# Patient Record
Sex: MALE | Race: BLACK OR AFRICAN AMERICAN | NOT HISPANIC OR LATINO | Employment: UNEMPLOYED | URBAN - METROPOLITAN AREA
[De-identification: names, ages, dates, MRNs, and addresses within clinical notes are randomized per-mention and may not be internally consistent; named-entity substitution may affect disease eponyms.]

---

## 2017-07-07 ENCOUNTER — HISTORICAL (OUTPATIENT)
Dept: LAB | Facility: HOSPITAL | Age: 57
End: 2017-07-07

## 2017-07-07 LAB
ALBUMIN SERPL-MCNC: 3.8 GM/DL (ref 3.4–5)
ALBUMIN/GLOB SERPL: 1.2 RATIO (ref 1.1–2)
ALP SERPL-CCNC: 78 UNIT/L (ref 46–116)
ALT SERPL-CCNC: 46 UNIT/L (ref 12–78)
APPEARANCE, UA: CLEAR
AST SERPL-CCNC: 30 UNIT/L (ref 15–37)
BACTERIA #/AREA URNS AUTO: NORMAL /HPF
BILIRUB SERPL-MCNC: 0.4 MG/DL (ref 0.2–1)
BILIRUB UR QL STRIP: NEGATIVE
BILIRUBIN DIRECT+TOT PNL SERPL-MCNC: 0.11 MG/DL (ref 0–0.2)
BILIRUBIN DIRECT+TOT PNL SERPL-MCNC: 0.29 MG/DL (ref 0–0.8)
BUN SERPL-MCNC: 12 MG/DL (ref 7–18)
CALCIUM SERPL-MCNC: 9 MG/DL (ref 8.5–10.1)
CHLORIDE SERPL-SCNC: 105 MMOL/L (ref 98–107)
CHOLEST SERPL-MCNC: 151 MG/DL (ref 0–200)
CHOLEST/HDLC SERPL: 3 {RATIO} (ref 0–5)
CO2 SERPL-SCNC: 27.4 MMOL/L (ref 21–32)
COLOR UR: YELLOW
CREAT SERPL-MCNC: 1.2 MG/DL (ref 0.6–1.3)
ERYTHROCYTE [DISTWIDTH] IN BLOOD BY AUTOMATED COUNT: 13.9 % (ref 11.5–17)
GLOBULIN SER-MCNC: 3.1 GM/DL (ref 2.4–3.5)
GLUCOSE (UA): NEGATIVE
GLUCOSE SERPL-MCNC: 114 MG/DL (ref 74–106)
HCT VFR BLD AUTO: 41.2 % (ref 42–52)
HDLC SERPL-MCNC: 50 MG/DL (ref 40–60)
HGB BLD-MCNC: 13.4 GM/DL (ref 14–18)
HGB UR QL STRIP: NEGATIVE
KETONES UR QL STRIP: NEGATIVE
LDLC SERPL CALC-MCNC: 81 MG/DL (ref 0–129)
LEUKOCYTE ESTERASE UR QL STRIP: NEGATIVE
MCH RBC QN AUTO: 30 PG (ref 27–31)
MCHC RBC AUTO-ENTMCNC: 32.4 GM/DL (ref 33–36)
MCV RBC AUTO: 92.5 FL (ref 80–94)
NITRITE UR QL STRIP.AUTO: NEGATIVE
PH UR STRIP: 5.5 [PH] (ref 5–9)
PLATELET # BLD AUTO: 220 X10(3)/MCL (ref 130–400)
PMV BLD AUTO: 8.6 FL (ref 7.4–10.4)
POTASSIUM SERPL-SCNC: 3.7 MMOL/L (ref 3.5–5.1)
PROT SERPL-MCNC: 6.9 GM/DL (ref 6.4–8.2)
PROT UR QL STRIP: NEGATIVE
RBC # BLD AUTO: 4.46 X10(6)/MCL (ref 4.7–6.1)
RBC #/AREA URNS HPF: NORMAL /[HPF]
SODIUM SERPL-SCNC: 143 MMOL/L (ref 136–145)
SP GR UR STRIP: >=1.03 (ref 1–1.03)
SQUAMOUS EPITHELIAL, UA: NORMAL
TRIGL SERPL-MCNC: 99 MG/DL
TSH SERPL-ACNC: 1.29 MIU/ML (ref 0.36–3.74)
UROBILINOGEN UR STRIP-ACNC: 0.2
VLDLC SERPL CALC-MCNC: 20 MG/DL
WBC # SPEC AUTO: 7.8 X10(3)/MCL (ref 4.5–11.5)
WBC #/AREA URNS AUTO: NORMAL /HPF

## 2017-10-20 ENCOUNTER — HISTORICAL (OUTPATIENT)
Dept: LAB | Facility: HOSPITAL | Age: 57
End: 2017-10-20

## 2017-10-20 LAB
ABS NEUT (OLG): 4.85 X10(3)/MCL (ref 2.1–9.2)
ALBUMIN SERPL-MCNC: 3.5 GM/DL (ref 3.4–5)
ALBUMIN/GLOB SERPL: 1 RATIO (ref 1.1–2)
ALP SERPL-CCNC: 76 UNIT/L (ref 50–136)
ALT SERPL-CCNC: 39 UNIT/L (ref 12–78)
AST SERPL-CCNC: 25 UNIT/L (ref 15–37)
BASOPHILS # BLD AUTO: 0 X10(3)/MCL (ref 0–0.2)
BASOPHILS NFR BLD AUTO: 1 %
BILIRUB SERPL-MCNC: 0.4 MG/DL (ref 0.2–1)
BILIRUBIN DIRECT+TOT PNL SERPL-MCNC: 0.1 MG/DL (ref 0–0.5)
BILIRUBIN DIRECT+TOT PNL SERPL-MCNC: 0.3 MG/DL (ref 0–0.8)
BUN SERPL-MCNC: 10 MG/DL (ref 7–18)
CALCIUM SERPL-MCNC: 8.9 MG/DL (ref 8.5–10.1)
CHLORIDE SERPL-SCNC: 105 MMOL/L (ref 98–107)
CO2 SERPL-SCNC: 29 MMOL/L (ref 21–32)
CREAT SERPL-MCNC: 1.66 MG/DL (ref 0.7–1.3)
EOSINOPHIL # BLD AUTO: 0.3 X10(3)/MCL (ref 0–0.9)
EOSINOPHIL NFR BLD AUTO: 4 %
ERYTHROCYTE [DISTWIDTH] IN BLOOD BY AUTOMATED COUNT: 13.7 % (ref 11.5–17)
GLOBULIN SER-MCNC: 3.4 GM/DL (ref 2.4–3.5)
GLUCOSE SERPL-MCNC: 104 MG/DL (ref 74–106)
HCT VFR BLD AUTO: 43.4 % (ref 42–52)
HGB BLD-MCNC: 14.7 GM/DL (ref 14–18)
LYMPHOCYTES # BLD AUTO: 2 X10(3)/MCL (ref 0.6–4.6)
LYMPHOCYTES NFR BLD AUTO: 25 %
MCH RBC QN AUTO: 30.8 PG (ref 27–31)
MCHC RBC AUTO-ENTMCNC: 33.9 GM/DL (ref 33–36)
MCV RBC AUTO: 91 FL (ref 80–94)
MONOCYTES # BLD AUTO: 0.6 X10(3)/MCL (ref 0.1–1.3)
MONOCYTES NFR BLD AUTO: 8 %
NEUTROPHILS # BLD AUTO: 4.85 X10(3)/MCL (ref 1.4–7.9)
NEUTROPHILS NFR BLD AUTO: 62 %
PLATELET # BLD AUTO: 205 X10(3)/MCL (ref 130–400)
PMV BLD AUTO: 10 FL (ref 9.4–12.4)
POTASSIUM SERPL-SCNC: 3.6 MMOL/L (ref 3.5–5.1)
PROT SERPL-MCNC: 6.9 GM/DL (ref 6.4–8.2)
RBC # BLD AUTO: 4.77 X10(6)/MCL (ref 4.7–6.1)
SODIUM SERPL-SCNC: 142 MMOL/L (ref 136–145)
WBC # SPEC AUTO: 7.8 X10(3)/MCL (ref 4.5–11.5)

## 2017-10-23 ENCOUNTER — HISTORICAL (OUTPATIENT)
Dept: ADMINISTRATIVE | Facility: HOSPITAL | Age: 57
End: 2017-10-23

## 2017-12-06 ENCOUNTER — HISTORICAL (OUTPATIENT)
Dept: LAB | Facility: HOSPITAL | Age: 57
End: 2017-12-06

## 2017-12-06 LAB — PSA SERPL-MCNC: 1.33 NG/ML (ref 0–4)

## 2018-12-03 ENCOUNTER — HISTORICAL (OUTPATIENT)
Dept: LAB | Facility: HOSPITAL | Age: 58
End: 2018-12-03

## 2018-12-03 LAB
ALBUMIN SERPL-MCNC: 3.7 GM/DL (ref 3.4–5)
ALBUMIN/GLOB SERPL: 1.1 RATIO (ref 1.1–2)
ALP SERPL-CCNC: 72 UNIT/L (ref 46–116)
ALT SERPL-CCNC: 40 UNIT/L (ref 12–78)
APPEARANCE, UA: CLEAR
AST SERPL-CCNC: 26 UNIT/L (ref 15–37)
BACTERIA #/AREA URNS AUTO: NORMAL /HPF
BILIRUB SERPL-MCNC: 0.4 MG/DL (ref 0.2–1)
BILIRUB UR QL STRIP: NEGATIVE
BILIRUBIN DIRECT+TOT PNL SERPL-MCNC: 0.13 MG/DL (ref 0–0.2)
BILIRUBIN DIRECT+TOT PNL SERPL-MCNC: 0.27 MG/DL (ref 0–0.8)
BUN SERPL-MCNC: 9.8 MG/DL (ref 7–18)
CALCIUM SERPL-MCNC: 9.5 MG/DL (ref 8.5–10.1)
CHLORIDE SERPL-SCNC: 102 MMOL/L (ref 98–107)
CHOLEST SERPL-MCNC: 157 MG/DL (ref 0–200)
CHOLEST/HDLC SERPL: 3.4 {RATIO} (ref 0–5)
CO2 SERPL-SCNC: 34.4 MMOL/L (ref 21–32)
COLOR UR: YELLOW
CREAT SERPL-MCNC: 1.22 MG/DL (ref 0.6–1.3)
ERYTHROCYTE [DISTWIDTH] IN BLOOD BY AUTOMATED COUNT: 12.9 % (ref 11.5–17)
GLOBULIN SER-MCNC: 3.5 GM/DL (ref 2.4–3.5)
GLUCOSE (UA): NEGATIVE
GLUCOSE SERPL-MCNC: 89 MG/DL (ref 74–106)
HCT VFR BLD AUTO: 42.6 % (ref 42–52)
HDLC SERPL-MCNC: 46 MG/DL (ref 40–60)
HGB BLD-MCNC: 14.1 GM/DL (ref 14–18)
HGB UR QL STRIP: NEGATIVE
KETONES UR QL STRIP: NEGATIVE
LDLC SERPL CALC-MCNC: 94 MG/DL (ref 0–129)
LEUKOCYTE ESTERASE UR QL STRIP: NEGATIVE
MCH RBC QN AUTO: 30.3 PG (ref 27–31)
MCHC RBC AUTO-ENTMCNC: 33.1 GM/DL (ref 33–36)
MCV RBC AUTO: 91.6 FL (ref 80–94)
NITRITE UR QL STRIP.AUTO: NEGATIVE
PH UR STRIP: 7.5 [PH] (ref 5–9)
PLATELET # BLD AUTO: 222 X10(3)/MCL (ref 130–400)
PMV BLD AUTO: 9.6 FL (ref 9.4–12.4)
POTASSIUM SERPL-SCNC: 4.6 MMOL/L (ref 3.5–5.1)
PROT SERPL-MCNC: 7.2 GM/DL (ref 6.4–8.2)
PROT UR QL STRIP: NEGATIVE
PSA SERPL-MCNC: 1.75 NG/ML (ref 0–4)
RBC # BLD AUTO: 4.65 X10(6)/MCL (ref 4.7–6.1)
RBC #/AREA URNS HPF: NORMAL /HPF
SODIUM SERPL-SCNC: 141 MMOL/L (ref 136–145)
SP GR UR STRIP: 1.01 (ref 1–1.03)
SQUAMOUS EPITHELIAL, UA: NORMAL
TRIGL SERPL-MCNC: 87 MG/DL
TSH SERPL-ACNC: 2.29 MIU/ML (ref 0.36–3.74)
UROBILINOGEN UR STRIP-ACNC: 0.2
VLDLC SERPL CALC-MCNC: 17 MG/DL
WBC # SPEC AUTO: 5.4 X10(3)/MCL (ref 4.5–11.5)
WBC #/AREA URNS AUTO: NORMAL /[HPF]

## 2019-12-09 ENCOUNTER — HISTORICAL (OUTPATIENT)
Dept: LAB | Facility: HOSPITAL | Age: 59
End: 2019-12-09

## 2019-12-09 LAB
ALBUMIN SERPL-MCNC: 3.4 GM/DL (ref 3.4–5)
ALBUMIN/GLOB SERPL: 1 RATIO (ref 1.1–2)
ALP SERPL-CCNC: 72 UNIT/L (ref 46–116)
ALT SERPL-CCNC: 49 UNIT/L (ref 12–78)
APPEARANCE, UA: CLEAR
AST SERPL-CCNC: 39 UNIT/L (ref 15–37)
BACTERIA SPEC CULT: ABNORMAL
BILIRUB SERPL-MCNC: 0.5 MG/DL (ref 0.2–1)
BILIRUB UR QL STRIP: NEGATIVE
BILIRUBIN DIRECT+TOT PNL SERPL-MCNC: 0.18 MG/DL (ref 0–0.2)
BILIRUBIN DIRECT+TOT PNL SERPL-MCNC: 0.32 MG/DL (ref 0–0.8)
BUN SERPL-MCNC: 13.1 MG/DL (ref 7–18)
CALCIUM SERPL-MCNC: 8.8 MG/DL (ref 8.5–10.1)
CHLORIDE SERPL-SCNC: 101 MMOL/L (ref 98–107)
CHOLEST SERPL-MCNC: 145 MG/DL (ref 0–200)
CHOLEST/HDLC SERPL: 3.8 {RATIO} (ref 0–5)
CO2 SERPL-SCNC: 29.9 MMOL/L (ref 21–32)
COLOR UR: YELLOW
CREAT SERPL-MCNC: 1.39 MG/DL (ref 0.6–1.3)
ERYTHROCYTE [DISTWIDTH] IN BLOOD BY AUTOMATED COUNT: 13 % (ref 11.5–17)
GLOBULIN SER-MCNC: 3.3 GM/DL (ref 2.4–3.5)
GLUCOSE (UA): NEGATIVE
GLUCOSE SERPL-MCNC: 70 MG/DL (ref 74–106)
HCT VFR BLD AUTO: 41.2 % (ref 42–52)
HDLC SERPL-MCNC: 38 MG/DL (ref 40–60)
HGB BLD-MCNC: 13.3 GM/DL (ref 14–18)
HGB UR QL STRIP: NEGATIVE
KETONES UR QL STRIP: NEGATIVE
LDLC SERPL CALC-MCNC: 90 MG/DL (ref 0–129)
LEUKOCYTE ESTERASE UR QL STRIP: NEGATIVE
MCH RBC QN AUTO: 30.1 PG (ref 27–31)
MCHC RBC AUTO-ENTMCNC: 32.3 GM/DL (ref 33–36)
MCV RBC AUTO: 93.2 FL (ref 80–94)
NITRITE UR QL STRIP: NEGATIVE
PH UR STRIP: 6 [PH] (ref 5–9)
PLATELET # BLD AUTO: 224 X10(3)/MCL (ref 130–400)
PMV BLD AUTO: 10 FL (ref 9.4–12.4)
POTASSIUM SERPL-SCNC: 3.6 MMOL/L (ref 3.5–5.1)
PROT SERPL-MCNC: 6.7 GM/DL (ref 6.4–8.2)
PROT UR QL STRIP: NEGATIVE
PSA SERPL-MCNC: 1.39 NG/ML (ref 0–4)
RBC # BLD AUTO: 4.42 X10(6)/MCL (ref 4.7–6.1)
RBC #/AREA URNS HPF: ABNORMAL /HPF
SODIUM SERPL-SCNC: 141 MMOL/L (ref 136–145)
SP GR UR STRIP: 1.01 (ref 1–1.03)
SQUAMOUS EPITHELIAL, UA: ABNORMAL
TRIGL SERPL-MCNC: 83 MG/DL
TSH SERPL-ACNC: 2.25 MIU/ML (ref 0.36–3.74)
UROBILINOGEN UR STRIP-ACNC: 0.2
VLDLC SERPL CALC-MCNC: 17 MG/DL
WBC # SPEC AUTO: 4.5 X10(3)/MCL (ref 4.5–11.5)
WBC #/AREA URNS HPF: ABNORMAL /HPF

## 2020-10-27 ENCOUNTER — HISTORICAL (OUTPATIENT)
Dept: LAB | Facility: HOSPITAL | Age: 60
End: 2020-10-27

## 2020-10-27 LAB
ALBUMIN SERPL-MCNC: 4.05 GM/DL (ref 3.4–5)
ALBUMIN/GLOB SERPL: 0.9 RATIO (ref 1.1–2)
ALP SERPL-CCNC: 82 UNIT/L (ref 46–116)
ALT SERPL-CCNC: 49 UNIT/L (ref 12–78)
AST SERPL-CCNC: 37 UNIT/L (ref 15–37)
BILIRUB SERPL-MCNC: 0.7 MG/DL (ref 0.2–1)
BILIRUBIN DIRECT+TOT PNL SERPL-MCNC: 0.18 MG/DL (ref 0–0.2)
BILIRUBIN DIRECT+TOT PNL SERPL-MCNC: 0.52 MG/DL (ref 0–0.8)
BUN SERPL-MCNC: 11.8 MG/DL (ref 7–18)
CALCIUM SERPL-MCNC: 9.8 MG/DL (ref 8.5–10.1)
CHLORIDE SERPL-SCNC: 102 MMOL/L (ref 98–107)
CO2 SERPL-SCNC: 31.2 MMOL/L (ref 21–32)
CREAT SERPL-MCNC: 1.21 MG/DL (ref 0.6–1.3)
GLOBULIN SER-MCNC: 4.45 GM/DL (ref 2.4–3.5)
GLUCOSE SERPL-MCNC: 85 MG/DL (ref 74–106)
POTASSIUM SERPL-SCNC: 3.6 MMOL/L (ref 3.5–5.1)
PROT SERPL-MCNC: 8.5 GM/DL (ref 6.4–8.2)
SODIUM SERPL-SCNC: 140 MMOL/L (ref 136–145)

## 2020-12-21 ENCOUNTER — HISTORICAL (OUTPATIENT)
Dept: LAB | Facility: HOSPITAL | Age: 60
End: 2020-12-21

## 2020-12-21 LAB
HEMOCCULT SP1 STL QL: NEGATIVE
HEMOCCULT SP2 STL QL: NEGATIVE

## 2021-01-21 ENCOUNTER — HISTORICAL (OUTPATIENT)
Dept: LAB | Facility: HOSPITAL | Age: 61
End: 2021-01-21

## 2021-01-21 LAB
ALBUMIN SERPL-MCNC: 4.1 GM/DL (ref 3.4–4.8)
ALBUMIN/GLOB SERPL: 1.3 RATIO (ref 1.1–2)
ALP SERPL-CCNC: 75 UNIT/L (ref 40–150)
ALT SERPL-CCNC: 35 UNIT/L (ref 0–55)
APPEARANCE, UA: CLEAR
AST SERPL-CCNC: 31 UNIT/L (ref 5–34)
BACTERIA SPEC CULT: NORMAL
BILIRUB SERPL-MCNC: 0.6 MG/DL
BILIRUB UR QL STRIP: NEGATIVE
BILIRUBIN DIRECT+TOT PNL SERPL-MCNC: 0.2 MG/DL (ref 0–0.5)
BILIRUBIN DIRECT+TOT PNL SERPL-MCNC: 0.4 MG/DL (ref 0–0.8)
BUN SERPL-MCNC: 7.5 MG/DL (ref 8.4–25.7)
CALCIUM SERPL-MCNC: 9.3 MG/DL (ref 8.8–10)
CHLORIDE SERPL-SCNC: 101 MMOL/L (ref 98–107)
CHOLEST SERPL-MCNC: 146 MG/DL
CHOLEST/HDLC SERPL: 3 {RATIO} (ref 0–5)
CO2 SERPL-SCNC: 29 MMOL/L (ref 23–31)
COLOR UR: YELLOW
CREAT SERPL-MCNC: 1.17 MG/DL (ref 0.73–1.18)
ERYTHROCYTE [DISTWIDTH] IN BLOOD BY AUTOMATED COUNT: 13.2 % (ref 11.5–17)
GLOBULIN SER-MCNC: 3.2 GM/DL (ref 2.4–3.5)
GLUCOSE (UA): NEGATIVE
GLUCOSE SERPL-MCNC: 85 MG/DL (ref 82–115)
HCT VFR BLD AUTO: 43.5 % (ref 42–52)
HDLC SERPL-MCNC: 52 MG/DL (ref 35–60)
HGB BLD-MCNC: 14.1 GM/DL (ref 14–18)
HGB UR QL STRIP: NEGATIVE
KETONES UR QL STRIP: NEGATIVE
LDLC SERPL CALC-MCNC: 80 MG/DL (ref 50–140)
LEUKOCYTE ESTERASE UR QL STRIP: NEGATIVE
MCH RBC QN AUTO: 31.1 PG (ref 27–31)
MCHC RBC AUTO-ENTMCNC: 32.4 GM/DL (ref 33–36)
MCV RBC AUTO: 96 FL (ref 80–94)
NITRITE UR QL STRIP: NEGATIVE
PH UR STRIP: 8.5 [PH] (ref 5–9)
PLATELET # BLD AUTO: 247 X10(3)/MCL (ref 130–400)
PMV BLD AUTO: 9.7 FL (ref 9.4–12.4)
POTASSIUM SERPL-SCNC: 3.4 MMOL/L (ref 3.5–5.1)
PROT SERPL-MCNC: 7.3 GM/DL (ref 5.8–7.6)
PROT UR QL STRIP: NEGATIVE
PSA SERPL-MCNC: 3.05 NG/ML
RBC # BLD AUTO: 4.53 X10(6)/MCL (ref 4.7–6.1)
RBC #/AREA URNS HPF: NORMAL /[HPF]
SODIUM SERPL-SCNC: 138 MMOL/L (ref 136–145)
SP GR UR STRIP: 1.02 (ref 1–1.03)
SQUAMOUS EPITHELIAL, UA: NORMAL
TRIGL SERPL-MCNC: 69 MG/DL (ref 34–140)
TSH SERPL-ACNC: 2.59 UIU/ML (ref 0.35–4.94)
UROBILINOGEN UR STRIP-ACNC: 0.2
VLDLC SERPL CALC-MCNC: 14 MG/DL
WBC # SPEC AUTO: 6.4 X10(3)/MCL (ref 4.5–11.5)
WBC #/AREA URNS HPF: NORMAL /[HPF]

## 2021-09-08 ENCOUNTER — HISTORICAL (OUTPATIENT)
Dept: RADIOLOGY | Facility: HOSPITAL | Age: 61
End: 2021-09-08

## 2022-04-21 ENCOUNTER — HISTORICAL (OUTPATIENT)
Dept: LAB | Facility: HOSPITAL | Age: 62
End: 2022-04-21

## 2022-04-21 ENCOUNTER — HISTORICAL (OUTPATIENT)
Dept: ADMINISTRATIVE | Facility: HOSPITAL | Age: 62
End: 2022-04-21

## 2022-04-21 LAB
ALBUMIN SERPL-MCNC: 3.6 G/DL (ref 3.4–4.8)
ALBUMIN/GLOB SERPL: 1.1 {RATIO} (ref 1.1–2)
ALP SERPL-CCNC: 79 U/L (ref 40–150)
ALT SERPL-CCNC: 42 U/L (ref 0–55)
APPEARANCE, UA: NORMAL
AST SERPL-CCNC: 34 U/L (ref 5–34)
BACTERIA SPEC CULT: NORMAL
BILIRUB SERPL-MCNC: 0.5 MG/DL
BILIRUB UR QL STRIP: NEGATIVE
BILIRUBIN DIRECT+TOT PNL SERPL-MCNC: 0.2 (ref 0–0.8)
BILIRUBIN DIRECT+TOT PNL SERPL-MCNC: 0.3 (ref 0–0.5)
BUN SERPL-MCNC: 8.4 MG/DL (ref 8.4–25.7)
CALCIUM SERPL-MCNC: 9.1 MG/DL (ref 8.7–10.5)
CHLORIDE SERPL-SCNC: 106 MMOL/L (ref 98–107)
CHOLEST SERPL-MCNC: 142 MG/DL
CHOLEST/HDLC SERPL: 3 {RATIO} (ref 0–5)
CO2 SERPL-SCNC: 29 MMOL/L (ref 23–31)
COLOR UR: YELLOW
CREAT SERPL-MCNC: 0.96 MG/DL (ref 0.73–1.18)
ERYTHROCYTE [DISTWIDTH] IN BLOOD BY AUTOMATED COUNT: 13.5 % (ref 11.5–17)
GLOBULIN SER-MCNC: 3.4 G/DL (ref 2.4–3.5)
GLUCOSE (UA): NEGATIVE
GLUCOSE SERPL-MCNC: 72 MG/DL (ref 82–115)
HBV SURFACE AG SERPL QL IA: 0.22
HBV SURFACE AG SERPL QL IA: NONREACTIVE
HCT VFR BLD AUTO: 44.7 % (ref 42–52)
HCV AB SERPL QL IA: 16.42
HCV AB SERPL QL IA: REACTIVE
HDLC SERPL-MCNC: 47 MG/DL (ref 35–60)
HEMOLYSIS INTERF INDEX SERPL-ACNC: 4
HGB BLD-MCNC: 13.8 G/DL (ref 14–18)
HGB UR QL STRIP: NEGATIVE
ICTERIC INTERF INDEX SERPL-ACNC: 0
KETONES UR QL STRIP: NEGATIVE
LDLC SERPL CALC-MCNC: 82 MG/DL (ref 50–140)
LEUKOCYTE ESTERASE UR QL STRIP: NORMAL
LIPEMIC INTERF INDEX SERPL-ACNC: 2
MCH RBC QN AUTO: 29.6 PG (ref 27–31)
MCHC RBC AUTO-ENTMCNC: 30.9 G/DL (ref 33–36)
MCV RBC AUTO: 95.9 FL (ref 80–94)
MUCOUS THREADS URNS QL MICRO: NORMAL
NITRITE UR QL STRIP: NEGATIVE
PH UR STRIP: 6.5 [PH] (ref 5–9)
PLATELET # BLD AUTO: 204 10*3/UL (ref 130–400)
PMV BLD AUTO: 9.7 FL (ref 9.4–12.4)
POTASSIUM SERPL-SCNC: 4 MMOL/L (ref 3.5–5.1)
PROT SERPL-MCNC: 7 G/DL (ref 5.8–7.6)
PROT UR QL STRIP: NEGATIVE
PSA SERPL-MCNC: 1.63 NG/ML
RBC # BLD AUTO: 4.66 10*6/UL (ref 4.7–6.1)
RBC #/AREA URNS HPF: NORMAL /[HPF] (ref 0–2)
SODIUM SERPL-SCNC: 144 MMOL/L (ref 136–145)
SP GR UR STRIP: 1.01 (ref 1–1.03)
SQUAMOUS EPITHELIAL, UA: NORMAL
TRIGL SERPL-MCNC: 66 MG/DL (ref 34–140)
TSH SERPL-ACNC: 1.32 M[IU]/L (ref 0.35–4.94)
UROBILINOGEN UR STRIP-ACNC: 0.2
VLDLC SERPL CALC-MCNC: 13 MG/DL
WBC # SPEC AUTO: 5.9 10*3/UL (ref 4.5–11.5)
WBC #/AREA URNS HPF: NORMAL /[HPF] (ref 0–2)

## 2022-07-12 ENCOUNTER — LAB VISIT (OUTPATIENT)
Dept: LAB | Facility: HOSPITAL | Age: 62
End: 2022-07-12
Attending: FAMILY MEDICINE
Payer: MEDICAID

## 2022-07-12 DIAGNOSIS — B19.20: Primary | ICD-10-CM

## 2022-07-12 PROCEDURE — 87522 HEPATITIS C REVRS TRNSCRPJ: CPT

## 2022-07-12 PROCEDURE — 36415 COLL VENOUS BLD VENIPUNCTURE: CPT

## 2022-07-14 LAB — HCV RNA SERPL NAA+PROBE-ACNC: ABNORMAL IU/ML

## 2022-09-07 DIAGNOSIS — B19.20 HEPATITIS C VIRUS INFECTION WITHOUT HEPATIC COMA, UNSPECIFIED CHRONICITY: Primary | ICD-10-CM

## 2022-09-12 DIAGNOSIS — B19.20 HEPATITIS C VIRUS INFECTION WITHOUT HEPATIC COMA, UNSPECIFIED CHRONICITY: Primary | ICD-10-CM

## 2022-09-26 ENCOUNTER — HOSPITAL ENCOUNTER (OUTPATIENT)
Dept: RADIOLOGY | Facility: HOSPITAL | Age: 62
Discharge: HOME | End: 2022-09-26
Attending: NURSE PRACTITIONER
Payer: MEDICAID

## 2022-09-26 DIAGNOSIS — B19.20 HEPATITIS C VIRUS INFECTION WITHOUT HEPATIC COMA, UNSPECIFIED CHRONICITY: ICD-10-CM

## 2022-09-26 PROCEDURE — 76705 ECHO EXAM OF ABDOMEN: CPT | Mod: TC

## 2022-11-17 ENCOUNTER — TELEPHONE (OUTPATIENT)
Dept: INFECTIOUS DISEASES | Facility: CLINIC | Age: 62
End: 2022-11-17
Payer: MEDICAID

## 2022-11-18 ENCOUNTER — LAB VISIT (OUTPATIENT)
Dept: LAB | Facility: HOSPITAL | Age: 62
End: 2022-11-18
Attending: NURSE PRACTITIONER
Payer: MEDICAID

## 2022-11-18 DIAGNOSIS — B19.20 HEPATITIS C VIRUS INFECTION WITHOUT HEPATIC COMA, UNSPECIFIED CHRONICITY: ICD-10-CM

## 2022-11-18 LAB
ALBUMIN SERPL-MCNC: 3.9 GM/DL (ref 3.4–4.8)
ALBUMIN/GLOB SERPL: 1.2 RATIO (ref 1.1–2)
ALP SERPL-CCNC: 81 UNIT/L (ref 40–150)
ALT SERPL-CCNC: 25 UNIT/L (ref 0–55)
AST SERPL-CCNC: 29 UNIT/L (ref 5–34)
BASOPHILS # BLD AUTO: 0.06 X10(3)/MCL (ref 0–0.2)
BASOPHILS NFR BLD AUTO: 0.8 %
BILIRUBIN DIRECT+TOT PNL SERPL-MCNC: 0.5 MG/DL
BUN SERPL-MCNC: 8.4 MG/DL (ref 8.4–25.7)
CALCIUM SERPL-MCNC: 9.4 MG/DL (ref 8.8–10)
CHLORIDE SERPL-SCNC: 104 MMOL/L (ref 98–107)
CO2 SERPL-SCNC: 26 MMOL/L (ref 23–31)
CREAT SERPL-MCNC: 0.99 MG/DL (ref 0.73–1.18)
EOSINOPHIL # BLD AUTO: 0.33 X10(3)/MCL (ref 0–0.9)
EOSINOPHIL NFR BLD AUTO: 4.6 %
ERYTHROCYTE [DISTWIDTH] IN BLOOD BY AUTOMATED COUNT: 13.3 % (ref 11.5–17)
FERRITIN SERPL-MCNC: 211.63 NG/ML (ref 21.81–274.66)
GFR SERPLBLD CREATININE-BSD FMLA CKD-EPI: >60 MLS/MIN/1.73/M2
GLOBULIN SER-MCNC: 3.3 GM/DL (ref 2.4–3.5)
GLUCOSE SERPL-MCNC: 147 MG/DL (ref 82–115)
HAV AB SER QL IA: NONREACTIVE
HBV CORE AB SERPL QL IA: REACTIVE
HBV SURFACE AB SER-ACNC: 2.51 MIU/ML
HBV SURFACE AB SERPL IA-ACNC: NONREACTIVE M[IU]/ML
HBV SURFACE AG SERPL QL IA: NONREACTIVE
HCT VFR BLD AUTO: 45.3 % (ref 42–52)
HGB BLD-MCNC: 14.7 GM/DL (ref 14–18)
HIV 1+2 AB+HIV1 P24 AG SERPL QL IA: NONREACTIVE
IMM GRANULOCYTES # BLD AUTO: 0.02 X10(3)/MCL (ref 0–0.04)
IMM GRANULOCYTES NFR BLD AUTO: 0.3 %
LYMPHOCYTES # BLD AUTO: 1.66 X10(3)/MCL (ref 0.6–4.6)
LYMPHOCYTES NFR BLD AUTO: 22.9 %
MCH RBC QN AUTO: 30.3 PG (ref 27–31)
MCHC RBC AUTO-ENTMCNC: 32.5 MG/DL (ref 33–36)
MCV RBC AUTO: 93.4 FL (ref 80–94)
MONOCYTES # BLD AUTO: 0.57 X10(3)/MCL (ref 0.1–1.3)
MONOCYTES NFR BLD AUTO: 7.9 %
NEUTROPHILS # BLD AUTO: 4.6 X10(3)/MCL (ref 2.1–9.2)
NEUTROPHILS NFR BLD AUTO: 63.5 %
NRBC BLD AUTO-RTO: 0 %
PLATELET # BLD AUTO: 217 X10(3)/MCL (ref 130–400)
PMV BLD AUTO: 10.7 FL (ref 7.4–10.4)
POTASSIUM SERPL-SCNC: 3.1 MMOL/L (ref 3.5–5.1)
PROT SERPL-MCNC: 7.2 GM/DL (ref 5.8–7.6)
RBC # BLD AUTO: 4.85 X10(6)/MCL (ref 4.7–6.1)
SODIUM SERPL-SCNC: 141 MMOL/L (ref 136–145)
T PALLIDUM AB SER QL: NONREACTIVE
WBC # SPEC AUTO: 7.3 X10(3)/MCL (ref 4.5–11.5)

## 2022-11-18 PROCEDURE — 86780 TREPONEMA PALLIDUM: CPT

## 2022-11-18 PROCEDURE — 86706 HEP B SURFACE ANTIBODY: CPT

## 2022-11-18 PROCEDURE — 87522 HEPATITIS C REVRS TRNSCRPJ: CPT

## 2022-11-18 PROCEDURE — 85610 PROTHROMBIN TIME: CPT

## 2022-11-18 PROCEDURE — 87340 HEPATITIS B SURFACE AG IA: CPT

## 2022-11-18 PROCEDURE — 86704 HEP B CORE ANTIBODY TOTAL: CPT

## 2022-11-18 PROCEDURE — 80053 COMPREHEN METABOLIC PANEL: CPT

## 2022-11-18 PROCEDURE — 36415 COLL VENOUS BLD VENIPUNCTURE: CPT

## 2022-11-18 PROCEDURE — 86708 HEPATITIS A ANTIBODY: CPT

## 2022-11-18 PROCEDURE — 82728 ASSAY OF FERRITIN: CPT

## 2022-11-18 PROCEDURE — 85025 COMPLETE CBC W/AUTO DIFF WBC: CPT

## 2022-11-18 PROCEDURE — 87389 HIV-1 AG W/HIV-1&-2 AB AG IA: CPT

## 2022-11-18 PROCEDURE — 81596 NFCT DS CHRNC HCV 6 ASSAYS: CPT

## 2022-11-18 PROCEDURE — 86038 ANTINUCLEAR ANTIBODIES: CPT

## 2022-11-18 PROCEDURE — 86039 ANTINUCLEAR ANTIBODIES (ANA): CPT

## 2022-11-18 PROCEDURE — 87902 NFCT AGT GNTYP ALYS HEP C: CPT

## 2022-11-19 LAB — HCV RNA SERPL NAA+PROBE-ACNC: ABNORMAL IU/ML

## 2022-11-20 LAB
AR ANA INTERPRETIVE COMMENT: ABNORMAL
AR ANA PATTERN: ABNORMAL
AR ANA TITER: ABNORMAL
AR ANTINUCLEAR ANTIBODY (ANA), HEP-2, IGG: DETECTED

## 2022-11-21 LAB
A2 MACROGLOB SERPL-MCNC: 270 MG/DL (ref 100–280)
ALT SERPL W P-5'-P-CCNC: 35 U/L (ref 7–55)
ANNOTATION COMMENT IMP: NORMAL
APO A-I SERPL-MCNC: 148 MG/DL
BILIRUB SERPL-MCNC: 0.4 MG/DL
FIBROSIS STAGE SERPL QL: NORMAL
GGT SERPL-CCNC: 17 U/L (ref 8–61)
HAPTOGLOB SERPL NEPH-MCNC: 86 MG/DL (ref 30–200)
HCV GENTYP SERPL NAA+PROBE: ABNORMAL
LIVER FIBR SCORE SERPL CALC.FIBROSURE: 0.37
LIVER FIBROSIS INTERPRETATION SER-IMP: NORMAL
NECROINFLAMMATORY ACT GRADE SERPL QL: NORMAL
NECROINFLAMMATORY ACT SCORE SERPL: 0.2
NECROINFLAMMATORY ACTIV INTERP SER-IMP: NORMAL
SERIAL #: NORMAL

## 2022-11-29 ENCOUNTER — OFFICE VISIT (OUTPATIENT)
Dept: INFECTIOUS DISEASES | Facility: CLINIC | Age: 62
End: 2022-11-29
Payer: MEDICAID

## 2022-11-29 VITALS
HEART RATE: 73 BPM | DIASTOLIC BLOOD PRESSURE: 75 MMHG | RESPIRATION RATE: 20 BRPM | SYSTOLIC BLOOD PRESSURE: 121 MMHG | WEIGHT: 168 LBS | OXYGEN SATURATION: 98 % | BODY MASS INDEX: 22.26 KG/M2 | HEIGHT: 73 IN | TEMPERATURE: 99 F

## 2022-11-29 DIAGNOSIS — I49.9 IRREGULAR HEART RATE: ICD-10-CM

## 2022-11-29 DIAGNOSIS — B18.2 CHRONIC HEPATITIS C WITHOUT HEPATIC COMA: Primary | ICD-10-CM

## 2022-11-29 DIAGNOSIS — R76.8 HEPATITIS B CORE ANTIBODY POSITIVE: ICD-10-CM

## 2022-11-29 DIAGNOSIS — Z23 NEED FOR VACCINATION: ICD-10-CM

## 2022-11-29 PROBLEM — I10 HYPERTENSION: Status: ACTIVE | Noted: 2022-11-29

## 2022-11-29 PROBLEM — E78.00 HYPERCHOLESTEROLEMIA: Status: ACTIVE | Noted: 2022-11-29

## 2022-11-29 PROBLEM — F17.200 CURRENT SMOKER: Status: ACTIVE | Noted: 2022-11-29

## 2022-11-29 PROCEDURE — 1159F MED LIST DOCD IN RCRD: CPT | Mod: CPTII,,, | Performed by: NURSE PRACTITIONER

## 2022-11-29 PROCEDURE — 1160F PR REVIEW ALL MEDS BY PRESCRIBER/CLIN PHARMACIST DOCUMENTED: ICD-10-PCS | Mod: CPTII,,, | Performed by: NURSE PRACTITIONER

## 2022-11-29 PROCEDURE — 90632 HEPA VACCINE ADULT IM: CPT | Mod: PBBFAC

## 2022-11-29 PROCEDURE — 1160F RVW MEDS BY RX/DR IN RCRD: CPT | Mod: CPTII,,, | Performed by: NURSE PRACTITIONER

## 2022-11-29 PROCEDURE — 3078F PR MOST RECENT DIASTOLIC BLOOD PRESSURE < 80 MM HG: ICD-10-PCS | Mod: CPTII,,, | Performed by: NURSE PRACTITIONER

## 2022-11-29 PROCEDURE — 1159F PR MEDICATION LIST DOCUMENTED IN MEDICAL RECORD: ICD-10-PCS | Mod: CPTII,,, | Performed by: NURSE PRACTITIONER

## 2022-11-29 PROCEDURE — 3074F SYST BP LT 130 MM HG: CPT | Mod: CPTII,,, | Performed by: NURSE PRACTITIONER

## 2022-11-29 PROCEDURE — 3074F PR MOST RECENT SYSTOLIC BLOOD PRESSURE < 130 MM HG: ICD-10-PCS | Mod: CPTII,,, | Performed by: NURSE PRACTITIONER

## 2022-11-29 PROCEDURE — 3008F PR BODY MASS INDEX (BMI) DOCUMENTED: ICD-10-PCS | Mod: CPTII,,, | Performed by: NURSE PRACTITIONER

## 2022-11-29 PROCEDURE — 99204 PR OFFICE/OUTPT VISIT, NEW, LEVL IV, 45-59 MIN: ICD-10-PCS | Mod: S$PBB,,, | Performed by: NURSE PRACTITIONER

## 2022-11-29 PROCEDURE — 3078F DIAST BP <80 MM HG: CPT | Mod: CPTII,,, | Performed by: NURSE PRACTITIONER

## 2022-11-29 PROCEDURE — 99204 OFFICE O/P NEW MOD 45 MIN: CPT | Mod: S$PBB,,, | Performed by: NURSE PRACTITIONER

## 2022-11-29 PROCEDURE — 3008F BODY MASS INDEX DOCD: CPT | Mod: CPTII,,, | Performed by: NURSE PRACTITIONER

## 2022-11-29 PROCEDURE — 90471 IMMUNIZATION ADMIN: CPT | Mod: PBBFAC

## 2022-11-29 PROCEDURE — 99214 OFFICE O/P EST MOD 30 MIN: CPT | Mod: PBBFAC | Performed by: NURSE PRACTITIONER

## 2022-11-29 RX ORDER — ATORVASTATIN CALCIUM 20 MG/1
20 TABLET, FILM COATED ORAL NIGHTLY
COMMUNITY
Start: 2022-11-19 | End: 2023-07-11 | Stop reason: SDUPTHER

## 2022-11-29 RX ORDER — AMLODIPINE BESYLATE 5 MG/1
5 TABLET ORAL DAILY
COMMUNITY
Start: 2022-10-31 | End: 2023-07-11 | Stop reason: SDUPTHER

## 2022-11-29 RX ORDER — GABAPENTIN 600 MG/1
600 TABLET ORAL 3 TIMES DAILY
COMMUNITY
Start: 2022-11-19 | End: 2023-02-02

## 2022-11-29 RX ORDER — ASPIRIN 81 MG/1
81 TABLET ORAL DAILY
COMMUNITY
End: 2023-07-11 | Stop reason: SDUPTHER

## 2022-11-29 RX ORDER — TIZANIDINE 4 MG/1
4 TABLET ORAL 2 TIMES DAILY
COMMUNITY
Start: 2022-11-19 | End: 2023-07-11 | Stop reason: SDUPTHER

## 2022-11-29 RX ORDER — CYPROHEPTADINE HYDROCHLORIDE 4 MG/1
4 TABLET ORAL NIGHTLY
COMMUNITY
Start: 2022-11-27 | End: 2023-07-11 | Stop reason: SDUPTHER

## 2022-11-29 RX ORDER — LAMIVUDINE 100 MG/1
100 TABLET, FILM COATED ORAL NIGHTLY
Qty: 84 TABLET | Refills: 0 | Status: SHIPPED | OUTPATIENT
Start: 2022-11-29 | End: 2022-12-29

## 2022-11-29 RX ORDER — VELPATASVIR AND SOFOSBUVIR 100; 400 MG/1; MG/1
1 TABLET, FILM COATED ORAL NIGHTLY
Qty: 84 TABLET | Refills: 0 | Status: SHIPPED | OUTPATIENT
Start: 2022-11-29 | End: 2023-03-09

## 2022-11-29 RX ORDER — ALBUTEROL SULFATE 90 UG/1
AEROSOL, METERED RESPIRATORY (INHALATION)
COMMUNITY
Start: 2022-11-28 | End: 2023-07-11 | Stop reason: SDUPTHER

## 2022-11-29 RX ORDER — ROPINIROLE 1 MG/1
1 TABLET, FILM COATED ORAL NIGHTLY
COMMUNITY
Start: 2022-10-05 | End: 2023-07-11 | Stop reason: SDUPTHER

## 2022-11-29 NOTE — NURSING
VIS printed and given to patient. Hep A and Influenza Vaccines administered using aseptic technique. Patient observed for 15 minutes for reactions, none noted. Patient tolerated well.

## 2022-12-01 ENCOUNTER — PROCEDURE VISIT (OUTPATIENT)
Dept: INFECTIOUS DISEASES | Facility: CLINIC | Age: 62
End: 2022-12-01
Payer: MEDICAID

## 2022-12-01 ENCOUNTER — CLINICAL SUPPORT (OUTPATIENT)
Dept: INFECTIOUS DISEASES | Facility: CLINIC | Age: 62
End: 2022-12-01
Payer: MEDICAID

## 2022-12-01 DIAGNOSIS — B18.2 CHRONIC HEPATITIS C WITHOUT HEPATIC COMA: Primary | ICD-10-CM

## 2022-12-01 PROCEDURE — 99211 OFF/OP EST MAY X REQ PHY/QHP: CPT | Mod: PBBFAC

## 2022-12-01 PROCEDURE — 91200 LIVER ELASTOGRAPHY: CPT | Mod: PBBFAC | Performed by: INTERNAL MEDICINE

## 2022-12-01 NOTE — PROGRESS NOTES
Pt came to clinic today for Epclusa teaching.  Pt was given Epclusa, by me.  Explained Hep  C summary form, Epclusa one tablet daily, make sure to have next month's Epclusa available before running out, increase fluids to help with possible side effects of headaches and fatique.  Okay to take ibuprofen for headaches, no alcohol intake, and before starting any new scripts, call our clinic or the pharmacy.  Notified that labs are due every 4 weeks while on treatment.  Hep C summary form, lab orders/dates given to patient.  Patient verbalized understanding of all the above information.

## 2022-12-28 ENCOUNTER — LAB VISIT (OUTPATIENT)
Dept: LAB | Facility: HOSPITAL | Age: 62
End: 2022-12-28
Attending: NURSE PRACTITIONER
Payer: MEDICAID

## 2022-12-28 DIAGNOSIS — B18.2 CHRONIC HEPATITIS C WITHOUT HEPATIC COMA: ICD-10-CM

## 2022-12-28 LAB
ALBUMIN SERPL-MCNC: 4 G/DL (ref 3.4–4.8)
ALBUMIN/GLOB SERPL: 1.1 RATIO (ref 1.1–2)
ALP SERPL-CCNC: 78 UNIT/L (ref 40–150)
ALT SERPL-CCNC: 16 UNIT/L (ref 0–55)
AST SERPL-CCNC: 23 UNIT/L (ref 5–34)
BASOPHILS # BLD AUTO: 0.06 X10(3)/MCL (ref 0–0.2)
BASOPHILS NFR BLD AUTO: 0.9 %
BILIRUBIN DIRECT+TOT PNL SERPL-MCNC: 0.6 MG/DL
BUN SERPL-MCNC: 8.7 MG/DL (ref 8.4–25.7)
CALCIUM SERPL-MCNC: 9.6 MG/DL (ref 8.8–10)
CHLORIDE SERPL-SCNC: 105 MMOL/L (ref 98–107)
CO2 SERPL-SCNC: 26 MMOL/L (ref 23–31)
CREAT SERPL-MCNC: 1.05 MG/DL (ref 0.73–1.18)
EOSINOPHIL # BLD AUTO: 0.27 X10(3)/MCL (ref 0–0.9)
EOSINOPHIL NFR BLD AUTO: 4 %
ERYTHROCYTE [DISTWIDTH] IN BLOOD BY AUTOMATED COUNT: 13.4 % (ref 11.6–14.4)
GFR SERPLBLD CREATININE-BSD FMLA CKD-EPI: >60 MLS/MIN/1.73/M2
GLOBULIN SER-MCNC: 3.7 GM/DL (ref 2.4–3.5)
GLUCOSE SERPL-MCNC: 90 MG/DL (ref 82–115)
HCT VFR BLD AUTO: 44.9 % (ref 42–52)
HGB BLD-MCNC: 15 GM/DL (ref 14–18)
IMM GRANULOCYTES # BLD AUTO: 0.01 X10(3)/MCL (ref 0–0.04)
IMM GRANULOCYTES NFR BLD AUTO: 0.1 %
LYMPHOCYTES # BLD AUTO: 2.05 X10(3)/MCL (ref 0.6–4.6)
LYMPHOCYTES NFR BLD AUTO: 30.6 %
MCH RBC QN AUTO: 30.9 PG
MCHC RBC AUTO-ENTMCNC: 33.4 MG/DL (ref 33–36)
MCV RBC AUTO: 92.4 FL (ref 80–94)
MONOCYTES # BLD AUTO: 0.58 X10(3)/MCL (ref 0.1–1.3)
MONOCYTES NFR BLD AUTO: 8.6 %
NEUTROPHILS # BLD AUTO: 3.74 X10(3)/MCL (ref 2.1–9.2)
NEUTROPHILS NFR BLD AUTO: 55.8 %
NRBC BLD AUTO-RTO: 0 % (ref 0–1)
PLATELET # BLD AUTO: 207 X10(3)/MCL (ref 140–371)
PMV BLD AUTO: 10.3 FL (ref 9.4–12.4)
POTASSIUM SERPL-SCNC: 3.8 MMOL/L (ref 3.5–5.1)
PROT SERPL-MCNC: 7.7 GM/DL (ref 5.8–7.6)
RBC # BLD AUTO: 4.86 X10(6)/MCL (ref 4.7–6.1)
SODIUM SERPL-SCNC: 140 MMOL/L (ref 136–145)
WBC # SPEC AUTO: 6.7 X10(3)/MCL (ref 4.5–11.5)

## 2022-12-28 PROCEDURE — 36415 COLL VENOUS BLD VENIPUNCTURE: CPT

## 2022-12-28 PROCEDURE — 80053 COMPREHEN METABOLIC PANEL: CPT

## 2022-12-28 PROCEDURE — 85025 COMPLETE CBC W/AUTO DIFF WBC: CPT

## 2022-12-28 PROCEDURE — 87522 HEPATITIS C REVRS TRNSCRPJ: CPT

## 2022-12-29 LAB — HCV RNA SERPL NAA+PROBE-ACNC: NORMAL IU/ML

## 2023-02-02 ENCOUNTER — OFFICE VISIT (OUTPATIENT)
Dept: INFECTIOUS DISEASES | Facility: CLINIC | Age: 63
End: 2023-02-02
Payer: MEDICAID

## 2023-02-02 VITALS
DIASTOLIC BLOOD PRESSURE: 91 MMHG | WEIGHT: 159.69 LBS | BODY MASS INDEX: 21.16 KG/M2 | TEMPERATURE: 98 F | HEART RATE: 70 BPM | HEIGHT: 73 IN | SYSTOLIC BLOOD PRESSURE: 131 MMHG

## 2023-02-02 DIAGNOSIS — B18.2 CHRONIC HEPATITIS C WITHOUT HEPATIC COMA: Primary | ICD-10-CM

## 2023-02-02 DIAGNOSIS — Z23 NEED FOR VACCINATION: ICD-10-CM

## 2023-02-02 DIAGNOSIS — E78.00 HYPERCHOLESTEROLEMIA: ICD-10-CM

## 2023-02-02 DIAGNOSIS — I10 PRIMARY HYPERTENSION: ICD-10-CM

## 2023-02-02 DIAGNOSIS — R76.8 HEPATITIS B CORE ANTIBODY POSITIVE: ICD-10-CM

## 2023-02-02 LAB
ALBUMIN SERPL-MCNC: 3.8 G/DL (ref 3.4–4.8)
ALBUMIN/GLOB SERPL: 1.4 RATIO (ref 1.1–2)
ALP SERPL-CCNC: 62 UNIT/L (ref 40–150)
ALT SERPL-CCNC: 15 UNIT/L (ref 0–55)
AST SERPL-CCNC: 20 UNIT/L (ref 5–34)
BASOPHILS # BLD AUTO: 0.04 X10(3)/MCL (ref 0–0.2)
BASOPHILS NFR BLD AUTO: 0.6 %
BILIRUBIN DIRECT+TOT PNL SERPL-MCNC: 0.5 MG/DL
BUN SERPL-MCNC: 8.3 MG/DL (ref 8.4–25.7)
CALCIUM SERPL-MCNC: 9.3 MG/DL (ref 8.8–10)
CHLORIDE SERPL-SCNC: 106 MMOL/L (ref 98–107)
CO2 SERPL-SCNC: 26 MMOL/L (ref 23–31)
CREAT SERPL-MCNC: 1.09 MG/DL (ref 0.73–1.18)
EOSINOPHIL # BLD AUTO: 0.19 X10(3)/MCL (ref 0–0.9)
EOSINOPHIL NFR BLD AUTO: 3 %
ERYTHROCYTE [DISTWIDTH] IN BLOOD BY AUTOMATED COUNT: 14.6 % (ref 11.5–17)
GFR SERPLBLD CREATININE-BSD FMLA CKD-EPI: >60 MLS/MIN/1.73/M2
GLOBULIN SER-MCNC: 2.7 GM/DL (ref 2.4–3.5)
GLUCOSE SERPL-MCNC: 82 MG/DL (ref 82–115)
HCT VFR BLD AUTO: 41.1 % (ref 42–52)
HGB BLD-MCNC: 13.5 GM/DL (ref 14–18)
IMM GRANULOCYTES # BLD AUTO: 0.01 X10(3)/MCL (ref 0–0.04)
IMM GRANULOCYTES NFR BLD AUTO: 0.2 %
LYMPHOCYTES # BLD AUTO: 1.6 X10(3)/MCL (ref 0.6–4.6)
LYMPHOCYTES NFR BLD AUTO: 25.7 %
MCH RBC QN AUTO: 31 PG
MCHC RBC AUTO-ENTMCNC: 32.8 MG/DL (ref 33–36)
MCV RBC AUTO: 94.5 FL (ref 80–94)
MONOCYTES # BLD AUTO: 0.52 X10(3)/MCL (ref 0.1–1.3)
MONOCYTES NFR BLD AUTO: 8.3 %
NEUTROPHILS # BLD AUTO: 3.87 X10(3)/MCL (ref 2.1–9.2)
NEUTROPHILS NFR BLD AUTO: 62.2 %
NRBC BLD AUTO-RTO: 0 %
PLATELET # BLD AUTO: 219 X10(3)/MCL (ref 130–400)
PMV BLD AUTO: 10 FL (ref 7.4–10.4)
POTASSIUM SERPL-SCNC: 4.3 MMOL/L (ref 3.5–5.1)
PROT SERPL-MCNC: 6.5 GM/DL (ref 5.8–7.6)
RBC # BLD AUTO: 4.35 X10(6)/MCL (ref 4.7–6.1)
SODIUM SERPL-SCNC: 140 MMOL/L (ref 136–145)
WBC # SPEC AUTO: 6.2 X10(3)/MCL (ref 4.5–11.5)

## 2023-02-02 PROCEDURE — 1159F MED LIST DOCD IN RCRD: CPT | Mod: CPTII,,, | Performed by: NURSE PRACTITIONER

## 2023-02-02 PROCEDURE — 3080F PR MOST RECENT DIASTOLIC BLOOD PRESSURE >= 90 MM HG: ICD-10-PCS | Mod: CPTII,,, | Performed by: NURSE PRACTITIONER

## 2023-02-02 PROCEDURE — 99214 PR OFFICE/OUTPT VISIT, EST, LEVL IV, 30-39 MIN: ICD-10-PCS | Mod: S$PBB,,, | Performed by: NURSE PRACTITIONER

## 2023-02-02 PROCEDURE — 99214 OFFICE O/P EST MOD 30 MIN: CPT | Mod: S$PBB,,, | Performed by: NURSE PRACTITIONER

## 2023-02-02 PROCEDURE — 3008F BODY MASS INDEX DOCD: CPT | Mod: CPTII,,, | Performed by: NURSE PRACTITIONER

## 2023-02-02 PROCEDURE — 3075F SYST BP GE 130 - 139MM HG: CPT | Mod: CPTII,,, | Performed by: NURSE PRACTITIONER

## 2023-02-02 PROCEDURE — 80053 COMPREHEN METABOLIC PANEL: CPT | Performed by: NURSE PRACTITIONER

## 2023-02-02 PROCEDURE — 85025 COMPLETE CBC W/AUTO DIFF WBC: CPT | Performed by: NURSE PRACTITIONER

## 2023-02-02 PROCEDURE — 3008F PR BODY MASS INDEX (BMI) DOCUMENTED: ICD-10-PCS | Mod: CPTII,,, | Performed by: NURSE PRACTITIONER

## 2023-02-02 PROCEDURE — 36415 COLL VENOUS BLD VENIPUNCTURE: CPT | Performed by: NURSE PRACTITIONER

## 2023-02-02 PROCEDURE — 3080F DIAST BP >= 90 MM HG: CPT | Mod: CPTII,,, | Performed by: NURSE PRACTITIONER

## 2023-02-02 PROCEDURE — 1159F PR MEDICATION LIST DOCUMENTED IN MEDICAL RECORD: ICD-10-PCS | Mod: CPTII,,, | Performed by: NURSE PRACTITIONER

## 2023-02-02 PROCEDURE — 90677 PCV20 VACCINE IM: CPT | Mod: PBBFAC

## 2023-02-02 PROCEDURE — 3075F PR MOST RECENT SYSTOLIC BLOOD PRESS GE 130-139MM HG: ICD-10-PCS | Mod: CPTII,,, | Performed by: NURSE PRACTITIONER

## 2023-02-02 PROCEDURE — 99214 OFFICE O/P EST MOD 30 MIN: CPT | Mod: PBBFAC | Performed by: NURSE PRACTITIONER

## 2023-02-02 PROCEDURE — 1160F PR REVIEW ALL MEDS BY PRESCRIBER/CLIN PHARMACIST DOCUMENTED: ICD-10-PCS | Mod: CPTII,,, | Performed by: NURSE PRACTITIONER

## 2023-02-02 PROCEDURE — 1160F RVW MEDS BY RX/DR IN RCRD: CPT | Mod: CPTII,,, | Performed by: NURSE PRACTITIONER

## 2023-02-02 RX ORDER — LAMIVUDINE 100 MG/1
100 TABLET, FILM COATED ORAL NIGHTLY
COMMUNITY
Start: 2023-01-19 | End: 2023-03-09

## 2023-02-02 NOTE — PROGRESS NOTES
Subjective:       Patient ID: Adair Real is a 62 y.o. male.    Chief Complaint: Followup Hep C (Requests referral to local PCP)    2/2/23  Mr. Borrero is a 63 yo AAM here today for HCV f/u visit.  He started Epclusa daily on 12/1/22 and has not noted any side effects.  He is also taking lamivudine daily to prevent reactivation of Hep B.  Week 4 labs collected 12/28/22, HCV not detected.  Renal function & liver enzymes WNL.  He is feeling well & desires recommended vaccination for pneumonia today.  He tells me that he would like referral to [facility] PCP as his current PCP is in [place] but he lives in [place] & it is easier to secure transportation here. All questions answered & concerns addressed.     11/29/22  Mr. Borrero is a 63 yo AAM presenting today for initial HCV evaluation.  Diagnosed 4/22 by PCP Dr. Bender & referred to [facility] for treatment.  He has a history significant for IVDU and home tattoos in the late 70s to early 80s.  Denies any history of blood transfusion or known HCV + sexual partners.  He is single but not currently sexually active, condom use encouraged if he should engage.  Voiced understanding.  RUQ abd u/s completed 9/22, liver wnl. Benign renal cyst noted.  Baseline HCV VL 2.4 mil, GT 1a.  Fibrosure 11/22 A0-1, F1-2.  Unable to have Fibroscan done today, will plan for day of teaching.  He is amenable to recommended vaccinations with flu & Hep A #1 today.  He occasionally drinks alcohol, denies illicit drug use.  K 3.1, will increase dietary potassium.  No known history of cardiac abnormalities, but states that he has been evaluated by a cardiologist in the past.  Noted irregular rhythm on exam today, rate WNL.  Will get EKG for further evaluation.  Voiced appreciation. Denies chest pain, shortness of breath, lightheadedness, or palpitations.  He is eager to start treatment for HCV asap & agrees to adhere to treatment plan per protocol.  All questions answered & concerns addressed.      Review of Systems   Constitutional: Negative.    HENT: Negative.     Respiratory: Negative.     Cardiovascular: Negative.    Gastrointestinal: Negative.    Genitourinary: Negative.    Integumentary:  Negative.   Neurological: Negative.    Hematological: Negative.    Psychiatric/Behavioral: Negative.         Objective:      Physical Exam  Vitals reviewed.   Constitutional:       General: He is not in acute distress.     Appearance: Normal appearance. He is not toxic-appearing.   Eyes:      General: No scleral icterus.  Cardiovascular:      Rate and Rhythm: Normal rate and regular rhythm.      Heart sounds: Normal heart sounds.   Pulmonary:      Effort: Pulmonary effort is normal. No respiratory distress.      Breath sounds: Normal breath sounds.   Abdominal:      General: Bowel sounds are normal. There is no distension.      Palpations: Abdomen is soft. There is no mass.      Tenderness: There is no abdominal tenderness.   Musculoskeletal:         General: Normal range of motion.   Skin:     General: Skin is warm and dry.   Neurological:      Mental Status: He is alert and oriented to person, place, and time.       Assessment:       Problem List Items Addressed This Visit    None  Visit Diagnoses       Chronic hepatitis C without hepatic coma    -  Primary    Hepatitis B core antibody positive                  Plan:           Chronic hepatitis C without hepatic coma  -     Comprehensive Metabolic Panel  -     CBC Auto Differential; Future; Expected date: 02/02/2023  Diagnosed 4/2022.  Treatment naive.  GT 1a, baseline VL 2.4 mil copies.  Fibrosure: 11/22 A0-1, F1-2.  FibroScan 12/1/22, results pending.  RUQ abdominal u/s: 9/22 liver WNL, benign renal cyst noted.  Blood precautions: do not share a razor, needle, toothbrush, clippers with anyone.  Continue Epclusa 1 po daily x 12 weeks, started 12/1/22.  Week 4 labs 12/28/22 HCV not detected.   Week 8 labs today.   RTC as previously scheduled.     Hepatitis B core  antibody positive  Continue Lamivudine 100 mg daily while on Epclusa (DAA) to minimize risk of HBV reactivation.     Need for vaccination  -     Pneumococcal Conjugate Vaccine (20 Valent) (IM)  PCV 20 today.   Seek Covid booster at local pharmacy in a couple of weeks.  Hep A #2 due 6/2023    Hypercholesterolemia  -     Ambulatory referral/consult to Internal Medicine; Future; Expected date: 02/09/2023  Primary hypertension  -     Ambulatory referral/consult to Internal Medicine; Future; Expected date: 02/09/2023  Refer to [facility] to establish care with PCP in [place] as requested.

## 2023-02-13 DIAGNOSIS — B18.2 CHRONIC HEPATITIS C WITHOUT HEPATIC COMA: Primary | ICD-10-CM

## 2023-02-13 RX ORDER — VELPATASVIR AND SOFOSBUVIR 100; 400 MG/1; MG/1
1 TABLET, FILM COATED ORAL NIGHTLY
Qty: 84 TABLET | Refills: 0 | OUTPATIENT
Start: 2023-02-13

## 2023-02-13 RX ORDER — LAMIVUDINE 100 MG/1
100 TABLET, FILM COATED ORAL NIGHTLY
Qty: 30 TABLET | Refills: 0 | OUTPATIENT
Start: 2023-02-13

## 2023-02-13 NOTE — TELEPHONE ENCOUNTER
Last visit with Tabatha Garner, APRN: 2/2/2023  Last visit in [facility] INFECTIOUS DISEASE: 2/2/2023    Patient's next visit in [facility] INFECTIOUS DISEASE: 3/9/2023     Last labs 02/02/2023    Please advise regarding rx refills

## 2023-02-20 NOTE — PROCEDURES
Fibroscan Procedure     Name: Adair Real  Date of Procedure : 2022  Interpreting Physician: aVleri Humphrey MD, MPH  Diagnosis: HCV    Probe: M    Fibroscan readin.8 kPa    Fibrosis: F 0-1     CAP readin dB/m    Steatosis: S0      Miscellaneous:

## 2023-03-09 ENCOUNTER — OFFICE VISIT (OUTPATIENT)
Dept: INFECTIOUS DISEASES | Facility: CLINIC | Age: 63
End: 2023-03-09
Payer: MEDICAID

## 2023-03-09 VITALS
DIASTOLIC BLOOD PRESSURE: 77 MMHG | WEIGHT: 157 LBS | SYSTOLIC BLOOD PRESSURE: 120 MMHG | RESPIRATION RATE: 16 BRPM | BODY MASS INDEX: 20.81 KG/M2 | HEART RATE: 55 BPM | HEIGHT: 73 IN | TEMPERATURE: 99 F

## 2023-03-09 DIAGNOSIS — B18.2 CHRONIC HEPATITIS C WITHOUT HEPATIC COMA: Primary | ICD-10-CM

## 2023-03-09 DIAGNOSIS — R76.8 HEPATITIS B CORE ANTIBODY POSITIVE: ICD-10-CM

## 2023-03-09 LAB
ALBUMIN SERPL-MCNC: 3.8 G/DL (ref 3.4–4.8)
ALBUMIN/GLOB SERPL: 1.2 RATIO (ref 1.1–2)
ALP SERPL-CCNC: 59 UNIT/L (ref 40–150)
ALT SERPL-CCNC: 10 UNIT/L (ref 0–55)
AST SERPL-CCNC: 18 UNIT/L (ref 5–34)
BASOPHILS # BLD AUTO: 0.04 X10(3)/MCL (ref 0–0.2)
BASOPHILS NFR BLD AUTO: 0.6 %
BILIRUBIN DIRECT+TOT PNL SERPL-MCNC: 0.5 MG/DL
BUN SERPL-MCNC: 11 MG/DL (ref 8.4–25.7)
CALCIUM SERPL-MCNC: 9.7 MG/DL (ref 8.8–10)
CHLORIDE SERPL-SCNC: 103 MMOL/L (ref 98–107)
CO2 SERPL-SCNC: 32 MMOL/L (ref 23–31)
CREAT SERPL-MCNC: 1.04 MG/DL (ref 0.73–1.18)
EOSINOPHIL # BLD AUTO: 0.1 X10(3)/MCL (ref 0–0.9)
EOSINOPHIL NFR BLD AUTO: 1.6 %
ERYTHROCYTE [DISTWIDTH] IN BLOOD BY AUTOMATED COUNT: 14.1 % (ref 11.5–17)
GFR SERPLBLD CREATININE-BSD FMLA CKD-EPI: >60 MLS/MIN/1.73/M2
GLOBULIN SER-MCNC: 3.2 GM/DL (ref 2.4–3.5)
GLUCOSE SERPL-MCNC: 96 MG/DL (ref 82–115)
HCT VFR BLD AUTO: 42.7 % (ref 42–52)
HGB BLD-MCNC: 14 G/DL (ref 14–18)
IMM GRANULOCYTES # BLD AUTO: 0.01 X10(3)/MCL (ref 0–0.04)
IMM GRANULOCYTES NFR BLD AUTO: 0.2 %
LYMPHOCYTES # BLD AUTO: 1.58 X10(3)/MCL (ref 0.6–4.6)
LYMPHOCYTES NFR BLD AUTO: 24.8 %
MCH RBC QN AUTO: 31.6 PG
MCHC RBC AUTO-ENTMCNC: 32.8 G/DL (ref 33–36)
MCV RBC AUTO: 96.4 FL (ref 80–94)
MONOCYTES # BLD AUTO: 0.56 X10(3)/MCL (ref 0.1–1.3)
MONOCYTES NFR BLD AUTO: 8.8 %
NEUTROPHILS # BLD AUTO: 4.07 X10(3)/MCL (ref 2.1–9.2)
NEUTROPHILS NFR BLD AUTO: 64 %
NRBC BLD AUTO-RTO: 0 %
PLATELET # BLD AUTO: 227 X10(3)/MCL (ref 130–400)
PMV BLD AUTO: 10.1 FL (ref 7.4–10.4)
POTASSIUM SERPL-SCNC: 3.9 MMOL/L (ref 3.5–5.1)
PROT SERPL-MCNC: 7 GM/DL (ref 5.8–7.6)
RBC # BLD AUTO: 4.43 X10(6)/MCL (ref 4.7–6.1)
SODIUM SERPL-SCNC: 141 MMOL/L (ref 136–145)
WBC # SPEC AUTO: 6.4 X10(3)/MCL (ref 4.5–11.5)

## 2023-03-09 PROCEDURE — 1159F MED LIST DOCD IN RCRD: CPT | Mod: CPTII,,, | Performed by: NURSE PRACTITIONER

## 2023-03-09 PROCEDURE — 1160F PR REVIEW ALL MEDS BY PRESCRIBER/CLIN PHARMACIST DOCUMENTED: ICD-10-PCS | Mod: CPTII,,, | Performed by: NURSE PRACTITIONER

## 2023-03-09 PROCEDURE — 87522 HEPATITIS C REVRS TRNSCRPJ: CPT | Mod: 90 | Performed by: NURSE PRACTITIONER

## 2023-03-09 PROCEDURE — 3074F SYST BP LT 130 MM HG: CPT | Mod: CPTII,,, | Performed by: NURSE PRACTITIONER

## 2023-03-09 PROCEDURE — 99213 PR OFFICE/OUTPT VISIT, EST, LEVL III, 20-29 MIN: ICD-10-PCS | Mod: S$PBB,,, | Performed by: NURSE PRACTITIONER

## 2023-03-09 PROCEDURE — 85025 COMPLETE CBC W/AUTO DIFF WBC: CPT | Performed by: NURSE PRACTITIONER

## 2023-03-09 PROCEDURE — 1160F RVW MEDS BY RX/DR IN RCRD: CPT | Mod: CPTII,,, | Performed by: NURSE PRACTITIONER

## 2023-03-09 PROCEDURE — 3008F PR BODY MASS INDEX (BMI) DOCUMENTED: ICD-10-PCS | Mod: CPTII,,, | Performed by: NURSE PRACTITIONER

## 2023-03-09 PROCEDURE — 1159F PR MEDICATION LIST DOCUMENTED IN MEDICAL RECORD: ICD-10-PCS | Mod: CPTII,,, | Performed by: NURSE PRACTITIONER

## 2023-03-09 PROCEDURE — 3074F PR MOST RECENT SYSTOLIC BLOOD PRESSURE < 130 MM HG: ICD-10-PCS | Mod: CPTII,,, | Performed by: NURSE PRACTITIONER

## 2023-03-09 PROCEDURE — 80053 COMPREHEN METABOLIC PANEL: CPT | Performed by: NURSE PRACTITIONER

## 2023-03-09 PROCEDURE — 99213 OFFICE O/P EST LOW 20 MIN: CPT | Mod: S$PBB,,, | Performed by: NURSE PRACTITIONER

## 2023-03-09 PROCEDURE — 36415 COLL VENOUS BLD VENIPUNCTURE: CPT | Performed by: NURSE PRACTITIONER

## 2023-03-09 PROCEDURE — 3078F DIAST BP <80 MM HG: CPT | Mod: CPTII,,, | Performed by: NURSE PRACTITIONER

## 2023-03-09 PROCEDURE — 3008F BODY MASS INDEX DOCD: CPT | Mod: CPTII,,, | Performed by: NURSE PRACTITIONER

## 2023-03-09 PROCEDURE — 3078F PR MOST RECENT DIASTOLIC BLOOD PRESSURE < 80 MM HG: ICD-10-PCS | Mod: CPTII,,, | Performed by: NURSE PRACTITIONER

## 2023-03-09 PROCEDURE — 99214 OFFICE O/P EST MOD 30 MIN: CPT | Mod: PBBFAC | Performed by: NURSE PRACTITIONER

## 2023-03-09 RX ORDER — UMECLIDINIUM BROMIDE AND VILANTEROL TRIFENATATE 62.5; 25 UG/1; UG/1
1 POWDER RESPIRATORY (INHALATION)
COMMUNITY
Start: 2023-03-07 | End: 2023-07-11 | Stop reason: SDUPTHER

## 2023-03-09 NOTE — PROGRESS NOTES
Subjective:       Patient ID: Adair Real is a 62 y.o. male.    Chief Complaint: Followup Hep C    3/9/23  Mr. Borrero is a 61 yo AAM here for HCV f/u visit.  He completed 12 weeks of Epclusa daily on 2/23/23 & tolerated it well without any noted side effects.  HCV not detected at week 4 of treatment on 12/28/22.  Will repeat VL today for end of treatment evaluation.  He has no concerns or questions today.     2/2/23  Mr. Borrero is a 61 yo AAM here today for HCV f/u visit.  He started Epclusa daily on 12/1/22 and has not noted any side effects.  He is also taking lamivudine daily to prevent reactivation of Hep B.  Week 4 labs collected 12/28/22, HCV not detected.  Renal function & liver enzymes WNL.  He is feeling well & desires recommended vaccination for pneumonia today.  He tells me that he would like referral to [facility] PCP as his current PCP is in [place] but he lives in [place] & it is easier to secure transportation here. All questions answered & concerns addressed.     11/29/22  Mr. Borrero is a 61 yo AAM presenting today for initial HCV evaluation.  Diagnosed 4/22 by PCP Dr. Bender & referred to [facility] for treatment.  He has a history significant for IVDU and home tattoos in the late 70s to early 80s.  Denies any history of blood transfusion or known HCV + sexual partners.  He is single but not currently sexually active, condom use encouraged if he should engage.  Voiced understanding.  RUQ abd u/s completed 9/22, liver wnl. Benign renal cyst noted.  Baseline HCV VL 2.4 mil, GT 1a.  Fibrosure 11/22 A0-1, F1-2.  Unable to have Fibroscan done today, will plan for day of teaching.  He is amenable to recommended vaccinations with flu & Hep A #1 today.  He occasionally drinks alcohol, denies illicit drug use.  K 3.1, will increase dietary potassium.  No known history of cardiac abnormalities, but states that he has been evaluated by a cardiologist in the past.  Noted irregular rhythm on exam today, rate WNL.   Will get EKG for further evaluation.  Voiced appreciation. Denies chest pain, shortness of breath, lightheadedness, or palpitations.  He is eager to start treatment for HCV asap & agrees to adhere to treatment plan per protocol.  All questions answered & concerns addressed.     Review of Systems   Constitutional: Negative.    HENT: Negative.     Respiratory: Negative.     Cardiovascular: Negative.    Gastrointestinal: Negative.    Genitourinary: Negative.    Integumentary:  Negative.   Neurological: Negative.    Hematological: Negative.    Psychiatric/Behavioral: Negative.         Objective:      Physical Exam  Vitals reviewed.   Constitutional:       General: He is not in acute distress.     Appearance: Normal appearance. He is not toxic-appearing.   Eyes:      General: No scleral icterus.  Cardiovascular:      Rate and Rhythm: Normal rate and regular rhythm.      Heart sounds: Normal heart sounds.   Pulmonary:      Effort: Pulmonary effort is normal. No respiratory distress.      Breath sounds: Normal breath sounds.   Abdominal:      General: Bowel sounds are normal. There is no distension.      Palpations: Abdomen is soft. There is no mass.      Tenderness: There is no abdominal tenderness.   Musculoskeletal:         General: Normal range of motion.   Skin:     General: Skin is warm and dry.   Neurological:      Mental Status: He is alert and oriented to person, place, and time.       Assessment:       Problem List Items Addressed This Visit    None      Plan:           Chronic hepatitis C without hepatic coma  -     Hepatitis C Virus Quantitative; Future; Expected date: 03/09/2023  -     CBC Auto Differential; Future; Expected date: 03/09/2023  -     Comprehensive Metabolic Panel  HX: Diagnosed 4/2022.  Treatment naive.  GT 1a, baseline VL 2.4 mil copies.  Fibrosure: 11/22 A0-1, F1-2.  FibroScan 12/1/22, S0, F0-1.  RUQ abdominal u/s: 9/22 liver WNL, benign renal cyst noted.  Blood precautions: do not share a  razor, needle, toothbrush, clippers with anyone.  Competed Epclusa 1 po daily x 12 weeks, 12/1/22-2/23/23.  Week 4 labs 12/28/22 HCV not detected.   Repeat HCV VL today with CBC & CMP.  Hep A #2 next visit.    RTC 3 months.     Hepatitis B core antibody positive  Discontinue lamivudine, DAA completed.

## 2023-03-10 LAB — HCV RNA SERPL NAA+PROBE-ACNC: NORMAL IU/ML

## 2023-04-20 DIAGNOSIS — Z87.891 PERSONAL HISTORY OF TOBACCO USE, PRESENTING HAZARDS TO HEALTH: Primary | ICD-10-CM

## 2023-05-09 ENCOUNTER — HOSPITAL ENCOUNTER (OUTPATIENT)
Dept: RADIOLOGY | Facility: HOSPITAL | Age: 63
Discharge: HOME | End: 2023-05-09
Attending: FAMILY MEDICINE
Payer: MEDICAID

## 2023-05-09 DIAGNOSIS — Z87.891 PERSONAL HISTORY OF TOBACCO USE, PRESENTING HAZARDS TO HEALTH: ICD-10-CM

## 2023-05-09 PROCEDURE — 71271 CT THORAX LUNG CANCER SCR C-: CPT | Mod: TC

## 2023-06-13 ENCOUNTER — OFFICE VISIT (OUTPATIENT)
Dept: INFECTIOUS DISEASES | Facility: CLINIC | Age: 63
End: 2023-06-13
Payer: MEDICAID

## 2023-06-13 VITALS
RESPIRATION RATE: 18 BRPM | TEMPERATURE: 98 F | HEART RATE: 68 BPM | DIASTOLIC BLOOD PRESSURE: 79 MMHG | WEIGHT: 159.81 LBS | SYSTOLIC BLOOD PRESSURE: 113 MMHG | BODY MASS INDEX: 21.18 KG/M2 | HEIGHT: 73 IN

## 2023-06-13 DIAGNOSIS — B18.2 CHRONIC HEPATITIS C WITHOUT HEPATIC COMA: Primary | ICD-10-CM

## 2023-06-13 DIAGNOSIS — Z23 NEED FOR VACCINATION: ICD-10-CM

## 2023-06-13 PROCEDURE — 90632 HEPA VACCINE ADULT IM: CPT | Mod: PBBFAC

## 2023-06-13 PROCEDURE — 90471 IMMUNIZATION ADMIN: CPT | Mod: PBBFAC

## 2023-06-13 PROCEDURE — 3074F SYST BP LT 130 MM HG: CPT | Mod: CPTII,,, | Performed by: NURSE PRACTITIONER

## 2023-06-13 PROCEDURE — 36415 COLL VENOUS BLD VENIPUNCTURE: CPT | Performed by: NURSE PRACTITIONER

## 2023-06-13 PROCEDURE — 3008F PR BODY MASS INDEX (BMI) DOCUMENTED: ICD-10-PCS | Mod: CPTII,,, | Performed by: NURSE PRACTITIONER

## 2023-06-13 PROCEDURE — 3078F DIAST BP <80 MM HG: CPT | Mod: CPTII,,, | Performed by: NURSE PRACTITIONER

## 2023-06-13 PROCEDURE — 1159F MED LIST DOCD IN RCRD: CPT | Mod: CPTII,,, | Performed by: NURSE PRACTITIONER

## 2023-06-13 PROCEDURE — 3078F PR MOST RECENT DIASTOLIC BLOOD PRESSURE < 80 MM HG: ICD-10-PCS | Mod: CPTII,,, | Performed by: NURSE PRACTITIONER

## 2023-06-13 PROCEDURE — 99214 OFFICE O/P EST MOD 30 MIN: CPT | Mod: S$PBB,,, | Performed by: NURSE PRACTITIONER

## 2023-06-13 PROCEDURE — 3074F PR MOST RECENT SYSTOLIC BLOOD PRESSURE < 130 MM HG: ICD-10-PCS | Mod: CPTII,,, | Performed by: NURSE PRACTITIONER

## 2023-06-13 PROCEDURE — 99214 PR OFFICE/OUTPT VISIT, EST, LEVL IV, 30-39 MIN: ICD-10-PCS | Mod: S$PBB,,, | Performed by: NURSE PRACTITIONER

## 2023-06-13 PROCEDURE — 3008F BODY MASS INDEX DOCD: CPT | Mod: CPTII,,, | Performed by: NURSE PRACTITIONER

## 2023-06-13 PROCEDURE — 1160F PR REVIEW ALL MEDS BY PRESCRIBER/CLIN PHARMACIST DOCUMENTED: ICD-10-PCS | Mod: CPTII,,, | Performed by: NURSE PRACTITIONER

## 2023-06-13 PROCEDURE — 99214 OFFICE O/P EST MOD 30 MIN: CPT | Mod: PBBFAC | Performed by: NURSE PRACTITIONER

## 2023-06-13 PROCEDURE — 87522 HEPATITIS C REVRS TRNSCRPJ: CPT | Performed by: NURSE PRACTITIONER

## 2023-06-13 PROCEDURE — 1159F PR MEDICATION LIST DOCUMENTED IN MEDICAL RECORD: ICD-10-PCS | Mod: CPTII,,, | Performed by: NURSE PRACTITIONER

## 2023-06-13 PROCEDURE — 1160F RVW MEDS BY RX/DR IN RCRD: CPT | Mod: CPTII,,, | Performed by: NURSE PRACTITIONER

## 2023-06-13 RX ORDER — GABAPENTIN 600 MG/1
600 TABLET ORAL 3 TIMES DAILY
COMMUNITY
Start: 2023-06-05 | End: 2023-07-11 | Stop reason: SDUPTHER

## 2023-06-13 RX ORDER — OMEPRAZOLE 20 MG/1
20 CAPSULE, DELAYED RELEASE ORAL DAILY
COMMUNITY
Start: 2023-06-05 | End: 2023-07-11 | Stop reason: SDUPTHER

## 2023-06-13 NOTE — PROGRESS NOTES
Subjective     Patient ID: Adair Real is a 62 y.o. male.    Chief Complaint: Followup Hep C (States does have PCP appt next month)    6/13/23  Mr. Borrero is a 61 yo AAM here today for HCV SVR12 visit.  Epclusa completed 2/23/23, but has not yet collected blood for repeat labs. Will collect today & call pt with results.  Denies any risk factors for HCV re-exposure.  He is due for Hep A #2 today, amenable to same. Scheduled for appt to establish care with PCP here next month.  Voiced appreciation for same. Doing well overall & has no concerns today.     3/9/23  Mr. Borrero is a 61 yo AAM here for HCV f/u visit.  He completed 12 weeks of Epclusa daily on 2/23/23 & tolerated it well without any noted side effects.  HCV not detected at week 4 of treatment on 12/28/22.  Will repeat VL today for end of treatment evaluation.  He has no concerns or questions today.      2/2/23  Mr. Borrero is a 61 yo AAM here today for HCV f/u visit.  He started Epclusa daily on 12/1/22 and has not noted any side effects.  He is also taking lamivudine daily to prevent reactivation of Hep B.  Week 4 labs collected 12/28/22, HCV not detected.  Renal function & liver enzymes WNL.  He is feeling well & desires recommended vaccination for pneumonia today.  He tells me that he would like referral to [facility] PCP as his current PCP is in [place] but he lives in [place] & it is easier to secure transportation here. All questions answered & concerns addressed.     11/29/22  Mr. Borrero is a 61 yo AAM presenting today for initial HCV evaluation.  Diagnosed 4/22 by PCP Dr. Bender & referred to [facility] for treatment.  He has a history significant for IVDU and home tattoos in the late 70s to early 80s.  Denies any history of blood transfusion or known HCV + sexual partners.  He is single but not currently sexually active, condom use encouraged if he should engage.  Voiced understanding.  RUQ abd u/s completed 9/22, liver wnl. Benign renal cyst noted.   Baseline HCV VL 2.4 mil, GT 1a.  Fibrosure 11/22 A0-1, F1-2.  Unable to have Fibroscan done today, will plan for day of teaching.  He is amenable to recommended vaccinations with flu & Hep A #1 today.  He occasionally drinks alcohol, denies illicit drug use.  K 3.1, will increase dietary potassium.  No known history of cardiac abnormalities, but states that he has been evaluated by a cardiologist in the past.  Noted irregular rhythm on exam today, rate WNL.  Will get EKG for further evaluation.  Voiced appreciation. Denies chest pain, shortness of breath, lightheadedness, or palpitations.  He is eager to start treatment for HCV asap & agrees to adhere to treatment plan per protocol.  All questions answered & concerns addressed.     Review of Systems   Constitutional: Negative.    HENT: Negative.     Respiratory: Negative.     Cardiovascular: Negative.    Gastrointestinal: Negative.    Genitourinary: Negative.    Integumentary:  Negative.   Neurological: Negative.    Hematological: Negative.    Psychiatric/Behavioral: Negative.          Objective     Physical Exam  Vitals reviewed.   Constitutional:       General: He is not in acute distress.     Appearance: Normal appearance. He is not toxic-appearing.   Eyes:      General: No scleral icterus.  Cardiovascular:      Rate and Rhythm: Normal rate and regular rhythm.      Heart sounds: Normal heart sounds.   Pulmonary:      Effort: Pulmonary effort is normal. No respiratory distress.      Breath sounds: Normal breath sounds.   Abdominal:      General: Bowel sounds are normal. There is no distension.      Palpations: Abdomen is soft. There is no mass.      Tenderness: There is no abdominal tenderness.   Musculoskeletal:         General: Normal range of motion.   Skin:     General: Skin is warm and dry.   Neurological:      Mental Status: He is alert and oriented to person, place, and time.          Assessment and Plan     1. Chronic hepatitis C without hepatic coma  -      Hepatitis C Virus Quantitative; Future; Expected date: 06/13/2023  HX: Diagnosed 4/2022.  Treatment naive.  GT 1a, baseline VL 2.4 mil copies.  Fibrosure: 11/22 A0-1, F1-2.  FibroScan 12/1/22, S0, F0-1.  RUQ abdominal u/s: 9/22 liver WNL, benign renal cyst noted.  Blood precautions: do not share a razor, needle, toothbrush, clippers with anyone.  Competed Epclusa 1 po daily x 12 weeks, 12/1/22-2/23/23.  Week 4 labs 12/28/22 HCV not detected.   Week 12 labs 3/9/23 HCV not detected.  SVR 12 labs today.   Presumptive post-HCV treatment counseling provided. Will call pt with results.     Congratulations on your Hepatitis C cure, to minimize risk of re-infection or progression of liver damage please consider the following recommendations.   Do not share a razor, toothbrush, needle, clippers with anyone.   Avoid all illicit drugs.  Minimize alcohol intake.   Hep C antibody will be positive for life, but does not provide immunity.  If you need repeat testing for Hepatitis C reinfection, will require Hepatitis C Viral Load (RNA) test.  Do not donate blood.  RTC PRN.   Follow-up with PCP for routine medical needs.    2. Need for vaccination  -     Hepatitis A Vaccine (Adult) (IM)  Hep A #2 today.

## 2023-06-14 LAB — HCV RNA SERPL NAA+PROBE-ACNC: NORMAL IU/ML

## 2023-06-15 ENCOUNTER — TELEPHONE (OUTPATIENT)
Dept: INFECTIOUS DISEASES | Facility: CLINIC | Age: 63
End: 2023-06-15
Payer: MEDICAID

## 2023-06-15 NOTE — TELEPHONE ENCOUNTER
----- Message from PETAR Maxwell sent at 6/15/2023  1:13 PM CDT -----  HCV not detected.  Please call pt with results confirming that his Hep C is cured.  Presumptive HCV cure counseling provided at office visit. Thank you.

## 2023-06-15 NOTE — PROGRESS NOTES
HCV not detected.  Please call pt with results confirming that his Hep C is cured.  Presumptive HCV cure counseling provided at office visit. Thank you.

## 2023-07-11 ENCOUNTER — OFFICE VISIT (OUTPATIENT)
Dept: INTERNAL MEDICINE | Facility: CLINIC | Age: 63
End: 2023-07-11
Payer: MEDICAID

## 2023-07-11 VITALS
HEIGHT: 73 IN | DIASTOLIC BLOOD PRESSURE: 85 MMHG | HEART RATE: 58 BPM | RESPIRATION RATE: 20 BRPM | WEIGHT: 160.06 LBS | SYSTOLIC BLOOD PRESSURE: 133 MMHG | OXYGEN SATURATION: 94 % | BODY MASS INDEX: 21.21 KG/M2 | TEMPERATURE: 98 F

## 2023-07-11 DIAGNOSIS — K21.9 GASTROESOPHAGEAL REFLUX DISEASE WITHOUT ESOPHAGITIS: ICD-10-CM

## 2023-07-11 DIAGNOSIS — Z13.21 ENCOUNTER FOR VITAMIN DEFICIENCY SCREENING: ICD-10-CM

## 2023-07-11 DIAGNOSIS — G62.9 PERIPHERAL POLYNEUROPATHY: ICD-10-CM

## 2023-07-11 DIAGNOSIS — F17.210 CIGARETTE NICOTINE DEPENDENCE, UNCOMPLICATED: ICD-10-CM

## 2023-07-11 DIAGNOSIS — R06.02 SHORTNESS OF BREATH: ICD-10-CM

## 2023-07-11 DIAGNOSIS — Z12.5 SCREENING FOR PROSTATE CANCER: ICD-10-CM

## 2023-07-11 DIAGNOSIS — G25.81 RESTLESS LEGS: ICD-10-CM

## 2023-07-11 DIAGNOSIS — Z12.11 SCREENING FOR MALIGNANT NEOPLASM OF COLON: ICD-10-CM

## 2023-07-11 DIAGNOSIS — Z13.29 SCREENING FOR THYROID DISORDER: ICD-10-CM

## 2023-07-11 DIAGNOSIS — M79.662 PAIN IN BOTH LOWER LEGS: ICD-10-CM

## 2023-07-11 DIAGNOSIS — I10 PRIMARY HYPERTENSION: Primary | ICD-10-CM

## 2023-07-11 DIAGNOSIS — Z13.0 SCREENING FOR IRON DEFICIENCY ANEMIA: ICD-10-CM

## 2023-07-11 DIAGNOSIS — Z13.1 SCREENING FOR DIABETES MELLITUS: ICD-10-CM

## 2023-07-11 DIAGNOSIS — M79.661 PAIN IN BOTH LOWER LEGS: ICD-10-CM

## 2023-07-11 DIAGNOSIS — E78.00 HYPERCHOLESTEROLEMIA: ICD-10-CM

## 2023-07-11 PROBLEM — F17.200 CURRENT SMOKER: Status: RESOLVED | Noted: 2022-11-29 | Resolved: 2023-07-11

## 2023-07-11 PROCEDURE — 99215 OFFICE O/P EST HI 40 MIN: CPT | Mod: PBBFAC

## 2023-07-11 PROCEDURE — 1160F PR REVIEW ALL MEDS BY PRESCRIBER/CLIN PHARMACIST DOCUMENTED: ICD-10-PCS | Mod: CPTII,,,

## 2023-07-11 PROCEDURE — 3079F PR MOST RECENT DIASTOLIC BLOOD PRESSURE 80-89 MM HG: ICD-10-PCS | Mod: CPTII,,,

## 2023-07-11 PROCEDURE — 3079F DIAST BP 80-89 MM HG: CPT | Mod: CPTII,,,

## 2023-07-11 PROCEDURE — 1159F MED LIST DOCD IN RCRD: CPT | Mod: CPTII,,,

## 2023-07-11 PROCEDURE — 1159F PR MEDICATION LIST DOCUMENTED IN MEDICAL RECORD: ICD-10-PCS | Mod: CPTII,,,

## 2023-07-11 PROCEDURE — 3008F BODY MASS INDEX DOCD: CPT | Mod: CPTII,,,

## 2023-07-11 PROCEDURE — 99214 OFFICE O/P EST MOD 30 MIN: CPT | Mod: S$PBB,25,,

## 2023-07-11 PROCEDURE — 3075F PR MOST RECENT SYSTOLIC BLOOD PRESS GE 130-139MM HG: ICD-10-PCS | Mod: CPTII,,,

## 2023-07-11 PROCEDURE — 99406 PR TOBACCO USE CESSATION INTERMEDIATE 3-10 MINUTES: ICD-10-PCS | Mod: S$PBB,,,

## 2023-07-11 PROCEDURE — 1160F RVW MEDS BY RX/DR IN RCRD: CPT | Mod: CPTII,,,

## 2023-07-11 PROCEDURE — 3008F PR BODY MASS INDEX (BMI) DOCUMENTED: ICD-10-PCS | Mod: CPTII,,,

## 2023-07-11 PROCEDURE — 99214 PR OFFICE/OUTPT VISIT, EST, LEVL IV, 30-39 MIN: ICD-10-PCS | Mod: S$PBB,25,,

## 2023-07-11 PROCEDURE — 3075F SYST BP GE 130 - 139MM HG: CPT | Mod: CPTII,,,

## 2023-07-11 PROCEDURE — 99406 BEHAV CHNG SMOKING 3-10 MIN: CPT | Mod: S$PBB,,,

## 2023-07-11 RX ORDER — ASPIRIN 81 MG/1
81 TABLET ORAL DAILY
Qty: 90 TABLET | Refills: 1 | Status: SHIPPED | OUTPATIENT
Start: 2023-07-11 | End: 2024-01-04 | Stop reason: SDUPTHER

## 2023-07-11 RX ORDER — UMECLIDINIUM BROMIDE AND VILANTEROL TRIFENATATE 62.5; 25 UG/1; UG/1
1 POWDER RESPIRATORY (INHALATION) DAILY
Qty: 60 EACH | Refills: 1 | Status: SHIPPED | OUTPATIENT
Start: 2023-07-11 | End: 2023-08-28 | Stop reason: SDUPTHER

## 2023-07-11 RX ORDER — ROPINIROLE 1 MG/1
1 TABLET, FILM COATED ORAL NIGHTLY
Qty: 90 TABLET | Refills: 1 | Status: SHIPPED | OUTPATIENT
Start: 2023-07-11 | End: 2023-08-28

## 2023-07-11 RX ORDER — ALBUTEROL SULFATE 90 UG/1
1-2 AEROSOL, METERED RESPIRATORY (INHALATION) EVERY 4 HOURS PRN
Qty: 18 G | Refills: 1 | Status: SHIPPED | OUTPATIENT
Start: 2023-07-11 | End: 2023-08-28 | Stop reason: SDUPTHER

## 2023-07-11 RX ORDER — GABAPENTIN 600 MG/1
600 TABLET ORAL 3 TIMES DAILY
Qty: 270 TABLET | Refills: 1 | Status: SHIPPED | OUTPATIENT
Start: 2023-07-11 | End: 2024-01-04

## 2023-07-11 RX ORDER — ATORVASTATIN CALCIUM 20 MG/1
20 TABLET, FILM COATED ORAL NIGHTLY
Qty: 90 TABLET | Refills: 1 | Status: SHIPPED | OUTPATIENT
Start: 2023-07-11 | End: 2024-01-04 | Stop reason: SDUPTHER

## 2023-07-11 RX ORDER — OMEPRAZOLE 20 MG/1
20 CAPSULE, DELAYED RELEASE ORAL DAILY
Qty: 90 CAPSULE | Refills: 1 | Status: SHIPPED | OUTPATIENT
Start: 2023-07-11 | End: 2024-01-04 | Stop reason: SDUPTHER

## 2023-07-11 RX ORDER — AMLODIPINE BESYLATE 5 MG/1
5 TABLET ORAL DAILY
Qty: 90 TABLET | Refills: 1 | Status: SHIPPED | OUTPATIENT
Start: 2023-07-11 | End: 2023-08-08

## 2023-07-11 RX ORDER — CYPROHEPTADINE HYDROCHLORIDE 4 MG/1
4 TABLET ORAL NIGHTLY
Qty: 90 TABLET | Refills: 1 | Status: SHIPPED | OUTPATIENT
Start: 2023-07-11 | End: 2023-07-11

## 2023-07-11 RX ORDER — TIZANIDINE 4 MG/1
4 TABLET ORAL EVERY 8 HOURS PRN
Qty: 60 TABLET | Refills: 1 | Status: SHIPPED | OUTPATIENT
Start: 2023-07-11 | End: 2024-01-04 | Stop reason: SDUPTHER

## 2023-07-11 RX ORDER — ACETAMINOPHEN 500 MG
1 TABLET ORAL 2 TIMES DAILY
Qty: 1 EACH | Refills: 0 | Status: SHIPPED | OUTPATIENT
Start: 2023-07-11

## 2023-07-11 NOTE — ASSESSMENT & PLAN NOTE
Dangers of cigarette smoking were reviewed with patient in detail. Patient was Counseled for 3 minutes and referred to smoking cessastion. Nicotine replacement options were discussed. Nicotine replacement was discussed- treatment deferred to smoking cessation department.

## 2023-07-11 NOTE — ASSESSMENT & PLAN NOTE
BP Readings from Last 3 Encounters:   07/11/23 133/85   06/13/23 113/79   03/09/23 120/77      At goal.  Follow a low sodium (less than 2 grams of sodium per day), DASH diet.   Continue amlodipine as prescribed - refilled today.  Rx BP monitor.  Case management referral for voucher for BP monitor.  Monitor blood pressure and report any consistent values greater than 140/90 and keep a log.  Request records from [facility].  Encouraged smoking cessation to aid in BP reduction and co-morbidities.   Maintain healthy weight with a BMI goal of <30.   Aerobic exercise for 150 minutes per week (or 5 days a week for 30 minutes each day).

## 2023-07-11 NOTE — ASSESSMENT & PLAN NOTE
5/9/2023-LDCT chest: Lungs: There are changes seen consistent with COPD and emphysema in the lungs bilaterally.  There is scattered punctate nodules seen in the lungs which measure 1-2 mm in size.  For example punctate 1 mm nodule in the left upper lobe image number 69 series 2 is and punctate 2 mm nodule in the right upper lobe image number 50 series 2.  PFT ordered.  Continue albuterol and umeclidinium-vilanterol - refilled today.  ED precautions.

## 2023-07-11 NOTE — ASSESSMENT & PLAN NOTE
Therapeutic exercise (stretching, flexing and warm baths) encouraged as well decreased caffeine, alcohol and tobacco use.  Continue requip - refilled today.  Magnesium level ordered.

## 2023-07-11 NOTE — ASSESSMENT & PLAN NOTE
Avoid spicy, acidic, fried food and alcohol.    Eat 2-3 hours before bed.   Avoid tight fitting clothes and chew food thoroughly.    Reduce caffeine intake, avoid soda.    Take omeprazole as prescribed - refilled today.  Encouraged smoking cessation.

## 2023-07-11 NOTE — ASSESSMENT & PLAN NOTE
Lipid panel ordered.  Continue atorvastatin as prescribed - refilled today.   Follow a low cholesterol, low saturated fat diet with less than 200 mg of cholesterol a day.   Avoid fried foods and high saturated fats.  Add flax seed or fish oil supplements to diet.   Increase dietary fiber.   Regular exercise improves cholesterol levels.  Physical activity 5 times a week for 30 minutes per day (or 150 minutes per week).   Stressed importance of dietary modifications.

## 2023-07-11 NOTE — ASSESSMENT & PLAN NOTE
Arterial US ordered to evaluate potential reason for pain.  Will refer to [facility] vascular clinic if abnormal.

## 2023-07-11 NOTE — PROGRESS NOTES
"     PATIENT NAME: Adair Real  : 1960  DATE: 23  MRN: [id number]          Reason for Visit/Chief Complaint   Medication Refill, Establish Care, Spasms, and Hypertension       History of Present Illness (HPI)     Adair Real is a 62 y.o. Black male presenting in clinic today for Medication Refill, Establish Care, Spasms, and Hypertension. Previous PCP: Dr. Nathan Bender. PMH: HTN, Hep C (treated wit Epclusa for 12 weeks - undetectable), HLD, restless leg syndrome, neuropathy smoker (cigarette and marijuana). He is followed by [facility]. Was previously followed by [facility] in [place], [place] - states he only went to 1 appt. Most recent EKG on 2022 revealed Sinus rhythm with Premature atrial complexes, Nonspecific T wave abnormality. 2021 - NM TST: Normal ETT: No significant exercise induced ST changes. PACs during exercise and rest. Max HR: 141 bpm (88% of maximum  bpm). Functional capacity: Fair. BP response: normal. HR response: normal. Stopped s/t leg pain, fatigue, and desire.    BP-133/85 - at goal. Denies CP, HA, dizziness, or vision changs. Experiences SOB at times. Does not check his blood pressure at home. Does not have a BP monitor.    He is c/o pain in BLE, states pain occurs when he is riding his bike. Pain decreases when he stops riding bike. States pain sometimes occurs when he is sitting for too long, then subsides if he stands. Denies swelling in legs. He describes the pain as "pounding." He also has a history of RLS, but sates he has been out of all medications for approximately 1 month.    Smokes 1ppd since age 16. Smokes marijuana "a joint or 2 per day." Former alcohol drinker. Denies chest pain, cough, headache, dizziness, weakness, abdominal pain, nausea, vomiting, diarrhea, constipation, dysuria, depression, anxiety, SI, and HI.     Prostate Cancer Screening - PSA: 2022. PSA ordered. Follow up annually.   Colon Cancer Screening - FIT negative on " 12/21/2020. Follow up annually. Cologuard ordered (7/11/2023).  Lung Cancer Screening- 5/9/2023: Low Dose CT-LungRads 2   Eye Exam -Several years. List of local eye doctors given to patient.   Dental Exam - Several years. List of local dentists given to patient.   Vaccinations: Flu - 11/29/2022 / Pneumococcal - 2/2/2023 / Shingles - 3/4/2020, 7/28/2020 / Tetanus - 3/4/2020 / Covid - 6/22/2021, 7/30/2021    Review of Systems     Review of Systems   Constitutional: Negative.    HENT: Negative.     Eyes: Negative.    Respiratory: Negative.     Cardiovascular: Negative.  Negative for chest pain, palpitations and leg swelling.        Pain in legs   Gastrointestinal: Negative.    Endocrine: Negative.    Genitourinary: Negative.    Musculoskeletal: Negative.    Skin: Negative.    Allergic/Immunologic: Negative.    Neurological: Negative.    Hematological: Negative.    Psychiatric/Behavioral: Negative.     All other systems reviewed and are negative.    Medical / Social / Family History     Past Medical History:   Diagnosis Date    Hyperlipidemia     Hypertension     Unspecified viral hepatitis C without hepatic coma          Past Surgical History:   Procedure Laterality Date    FRACTURE SURGERY           Social History  Adair Gunns  reports that he has been smoking cigarettes. He has been smoking an average of 1 pack per day. He has quit using smokeless tobacco. He reports that he does not currently use alcohol. He reports current drug use. Drug: Marijuana.    Family History  Adair Leone family history includes No Known Problems in his brother, father, mother, and sister.    Medications and Allergies     Medications  Current Outpatient Medications   Medication Instructions    albuterol (PROVENTIL/VENTOLIN HFA) 90 mcg/actuation inhaler 1-2 puffs, Inhalation, Every 4 hours PRN    amLODIPine (NORVASC) 5 mg, Oral, Daily    aspirin (ECOTRIN) 81 mg, Oral, Daily    atorvastatin (LIPITOR) 20 mg, Oral, Nightly    blood pressure  "monitor Kit 1 each, Misc.(Non-Drug; Combo Route), 2 times daily    gabapentin (NEURONTIN) 600 mg, Oral, 3 times daily    omeprazole (PRILOSEC) 20 mg, Oral, Daily    rOPINIRole (REQUIP) 1 mg, Oral, Nightly    tiZANidine (ZANAFLEX) 4 mg, Oral, Every 8 hours PRN    umeclidinium-vilanteroL (ANORO ELLIPTA) 62.5-25 mcg/actuation DsDv 1 puff, Inhalation, Daily       Allergies  Review of patient's allergies indicates:  No Known Allergies    Physical Examination   Visit Vitals  /85 (BP Location: Right arm, Patient Position: Sitting, BP Method: Large (Automatic))   Pulse (!) 58   Temp 97.8 °F (36.6 °C) (Oral)   Resp 20   Ht 6' 1" (1.854 m)   Wt 72.6 kg (160 lb 0.9 oz)   SpO2 (!) 94%   BMI 21.12 kg/m²     Physical Exam  Vitals reviewed.   Constitutional:       Appearance: Normal appearance. He is normal weight.   HENT:      Head: Normocephalic.      Nose: Nose normal.      Mouth/Throat:      Mouth: Mucous membranes are moist.      Pharynx: Oropharynx is clear.   Eyes:      Extraocular Movements: Extraocular movements intact.      Conjunctiva/sclera: Conjunctivae normal.      Pupils: Pupils are equal, round, and reactive to light.   Cardiovascular:      Rate and Rhythm: Normal rate and regular rhythm.      Heart sounds: Normal heart sounds.   Pulmonary:      Effort: Pulmonary effort is normal.      Breath sounds: Normal breath sounds.   Abdominal:      General: Abdomen is flat. Bowel sounds are normal.      Palpations: Abdomen is soft.   Musculoskeletal:         General: Normal range of motion.      Cervical back: Normal range of motion.   Skin:     General: Skin is warm and dry.      Capillary Refill: Capillary refill takes less than 2 seconds.   Neurological:      General: No focal deficit present.      Mental Status: He is alert and oriented to person, place, and time.   Psychiatric:         Mood and Affect: Mood normal.         Results     Lab Results   Component Value Date    WBC 6.4 03/09/2023    RBC 4.43 (L) " 03/09/2023    HGB 14.0 03/09/2023    HCT 42.7 03/09/2023    MCV 96.4 (H) 03/09/2023    MCH 31.6 03/09/2023    MCHC 32.8 (L) 03/09/2023    RDW 14.1 03/09/2023     03/09/2023    MPV 10.1 03/09/2023      Lab Results   Component Value Date     03/09/2023    K 3.9 03/09/2023    CHLORIDE 103 03/09/2023    CO2 32 (H) 03/09/2023    GLUCOSE 96 03/09/2023    BUN 11.0 03/09/2023    CREATININE 1.04 03/09/2023    LABPROT 7.0 03/09/2023    ALBUMIN 3.8 03/09/2023    BILITOT 0.5 03/09/2023    ALKPHOS 59 03/09/2023    AST 18 03/09/2023    ALT 10 03/09/2023    EGFRNORACEVR >60 03/09/2023     Lab Results   Component Value Date    TSH 1.3190 04/21/2022     Lab Results   Component Value Date    CHOL 142 04/21/2022    HDL 47 04/21/2022    LDL 82.00 04/21/2022    TRIG 66 04/21/2022     Lab Results   Component Value Date    PROTEINUA Negative 04/21/2022    BILIRUBINUA Negative 04/21/2022    WBCUA 5-10 04/21/2022    RBCUA None Seen 04/21/2022    BACTERIA Occasional 04/21/2022    NITRITESUA Negative 12/03/2018    LEUKOCYTESUR Moderate 04/21/2022    UROBILINOGEN 0.2 04/21/2022     No results found for: MICROALBCREA, CREATRANDUR, MICALBCREAT  No results found for: [id number], V12, FOLATE  Lab Results   Component Value Date    HIV Nonreactive 11/18/2022    HEPCAB Reactive 04/21/2022     No results found for: FITDIAG, COLOGUARD  No results found for: OCCBLDIA    Assessment and Plan (including Health Maintenance)     Problem List Items Addressed This Visit          Neuro    Peripheral polyneuropathy    Current Assessment & Plan     Maintain BP less than 140/90.  Smoking cessation.  Continue gabapentin - refilled today.         Relevant Medications    gabapentin (NEURONTIN) 600 MG tablet    Other Relevant Orders    Magnesium    Restless legs    Current Assessment & Plan     Therapeutic exercise (stretching, flexing and warm baths) encouraged as well decreased caffeine, alcohol and tobacco use.  Continue requip - refilled  today.  Magnesium level ordered.         Relevant Medications    tiZANidine (ZANAFLEX) 4 MG tablet    rOPINIRole (REQUIP) 1 MG tablet       Pulmonary    Shortness of breath    Current Assessment & Plan     5/9/2023-LDCT chest: Lungs: There are changes seen consistent with COPD and emphysema in the lungs bilaterally.  There is scattered punctate nodules seen in the lungs which measure 1-2 mm in size.  For example punctate 1 mm nodule in the left upper lobe image number 69 series 2 is and punctate 2 mm nodule in the right upper lobe image number 50 series 2.  PFT ordered.  Continue albuterol and umeclidinium-vilanterol - refilled today.  ED precautions.         Relevant Medications    umeclidinium-vilanteroL (ANORO ELLIPTA) 62.5-25 mcg/actuation DsDv    albuterol (PROVENTIL/VENTOLIN HFA) 90 mcg/actuation inhaler    Other Relevant Orders    Pulmonary function test       Cardiac/Vascular    Hypercholesterolemia    Current Assessment & Plan     Lipid panel ordered.  Continue atorvastatin as prescribed - refilled today.   Follow a low cholesterol, low saturated fat diet with less than 200 mg of cholesterol a day.   Avoid fried foods and high saturated fats.  Add flax seed or fish oil supplements to diet.   Increase dietary fiber.   Regular exercise improves cholesterol levels.  Physical activity 5 times a week for 30 minutes per day (or 150 minutes per week).   Stressed importance of dietary modifications.          Relevant Medications    atorvastatin (LIPITOR) 20 MG tablet    Other Relevant Orders    Lipid Panel    Primary hypertension - Primary    Current Assessment & Plan     BP Readings from Last 3 Encounters:   07/11/23 133/85   06/13/23 113/79   03/09/23 120/77      At goal.  Follow a low sodium (less than 2 grams of sodium per day), DASH diet.   Continue amlodipine as prescribed - refilled today.  Rx BP monitor.  Case management referral for voucher for BP monitor.  Monitor blood pressure and report any consistent  values greater than 140/90 and keep a log.  Request records from [facility].  Encouraged smoking cessation to aid in BP reduction and co-morbidities.   Maintain healthy weight with a BMI goal of <30.   Aerobic exercise for 150 minutes per week (or 5 days a week for 30 minutes each day).          Relevant Medications    aspirin (ECOTRIN) 81 MG EC tablet    amLODIPine (NORVASC) 5 MG tablet    blood pressure monitor Kit    Other Relevant Orders    Ambulatory referral/consult to Outpatient Case Management    Urinalysis, Reflex to Urine Culture    Microalbumin/Creatinine Ratio, Urine    Comprehensive Metabolic Panel    CBC Auto Differential       Endocrine    BMI 21.0-21.9, adult    Current Assessment & Plan     Body mass index is 21.12 kg/m². (At goal).              GI    Gastroesophageal reflux disease without esophagitis    Current Assessment & Plan     Avoid spicy, acidic, fried food and alcohol.    Eat 2-3 hours before bed.   Avoid tight fitting clothes and chew food thoroughly.    Reduce caffeine intake, avoid soda.    Take omeprazole as prescribed - refilled today.  Encouraged smoking cessation.           Relevant Medications    omeprazole (PRILOSEC) 20 MG capsule    Screening for malignant neoplasm of colon    Current Assessment & Plan     Cologuard ordered, will refer to GI if indicated.          Relevant Orders    Cologuard Screening (Multitarget Stool DNA)       Orthopedic    Pain in both lower legs    Current Assessment & Plan     Arterial US ordered to evaluate potential reason for pain.  Will refer to [facility] vascular clinic if abnormal.         Relevant Orders    CV Ultrasound doppler arterial legs bilat    Magnesium       Other    Cigarette nicotine dependence, uncomplicated    Current Assessment & Plan     Dangers of cigarette smoking were reviewed with patient in detail. Patient was Counseled for 3 minutes and referred to smoking cessastion. Nicotine replacement options were discussed. Nicotine  replacement was discussed- treatment deferred to smoking cessation department.         Relevant Orders    Ambulatory referral/consult to Smoking Cessation Program    Pulmonary function test     Other Visit Diagnoses       Screening for diabetes mellitus        Relevant Orders    Hemoglobin A1C    Screening for iron deficiency anemia        Relevant Orders    Iron and TIBC    Ferritin    Screening for prostate cancer        Relevant Orders    PSA, Screening    Encounter for vitamin deficiency screening        Relevant Orders    Vitamin D    Screening for thyroid disorder        Relevant Orders    TSH    T4, Free             Health Maintenance Due   Topic Date Due    COVID-19 Vaccine (3 - Moderna series) 09/24/2021    Colorectal Cancer Screening  12/21/2021     Tests to Keep You Healthy    Colon Cancer Screening: ORDERED  Last Blood Pressure <= 139/89 (7/11/2023): Yes  Tobacco Cessation: NO      Health Maintenance Topics with due status: Not Due       Topic Last Completion Date    TETANUS VACCINE 03/04/2020    Lipid Panel 04/21/2022    Influenza Vaccine 11/29/2022       Future Appointments   Date Time Provider Department Center   8/8/2023  8:00 AM GUS Mcguire [facility]   8/22/2023  2:00 PM Josephine Best [facility]      Follow up in about 4 weeks (around 8/8/2023) for F2F, Follow up, Med check, Lab review, Wellness, RTC PRN.          Signature:        GUS Mcguire  [facility]  [address] [id number]    Date of encounter: 7/11/23

## 2023-07-24 DIAGNOSIS — R19.5 POSITIVE COLORECTAL CANCER SCREENING USING COLOGUARD TEST: Primary | ICD-10-CM

## 2023-07-24 LAB — NONINV COLON CA DNA+OCC BLD SCRN STL QL: POSITIVE

## 2023-07-25 ENCOUNTER — TELEPHONE (OUTPATIENT)
Dept: INTERNAL MEDICINE | Facility: CLINIC | Age: 63
End: 2023-07-25
Payer: MEDICAID

## 2023-07-25 NOTE — TELEPHONE ENCOUNTER
----- Message from GUS Mcguire sent at 7/24/2023  5:00 PM CDT -----  Please inform Adair Real that the COLOGUARD stool test is POSITIVE. I am sending in a referral for a colonoscopy.    For any questions, please let me know.  Thank you,    SUE Loomis

## 2023-08-08 ENCOUNTER — OFFICE VISIT (OUTPATIENT)
Dept: INTERNAL MEDICINE | Facility: CLINIC | Age: 63
End: 2023-08-08
Payer: MEDICAID

## 2023-08-08 VITALS
SYSTOLIC BLOOD PRESSURE: 160 MMHG | OXYGEN SATURATION: 98 % | HEIGHT: 73 IN | DIASTOLIC BLOOD PRESSURE: 90 MMHG | TEMPERATURE: 98 F | WEIGHT: 158.5 LBS | HEART RATE: 56 BPM | RESPIRATION RATE: 18 BRPM | BODY MASS INDEX: 21.01 KG/M2

## 2023-08-08 DIAGNOSIS — Z00.00 WELL ADULT EXAM: Primary | ICD-10-CM

## 2023-08-08 DIAGNOSIS — E55.9 VITAMIN D INSUFFICIENCY: ICD-10-CM

## 2023-08-08 DIAGNOSIS — I10 PRIMARY HYPERTENSION: ICD-10-CM

## 2023-08-08 DIAGNOSIS — F17.210 CIGARETTE NICOTINE DEPENDENCE, UNCOMPLICATED: ICD-10-CM

## 2023-08-08 PROCEDURE — 1160F PR REVIEW ALL MEDS BY PRESCRIBER/CLIN PHARMACIST DOCUMENTED: ICD-10-PCS | Mod: CPTII,,,

## 2023-08-08 PROCEDURE — 3066F PR DOCUMENTATION OF TREATMENT FOR NEPHROPATHY: ICD-10-PCS | Mod: CPTII,,,

## 2023-08-08 PROCEDURE — 99214 OFFICE O/P EST MOD 30 MIN: CPT | Mod: 25,S$PBB,,

## 2023-08-08 PROCEDURE — 99214 PR OFFICE/OUTPT VISIT, EST, LEVL IV, 30-39 MIN: ICD-10-PCS | Mod: 25,S$PBB,,

## 2023-08-08 PROCEDURE — 3008F PR BODY MASS INDEX (BMI) DOCUMENTED: ICD-10-PCS | Mod: CPTII,,,

## 2023-08-08 PROCEDURE — 3061F NEG MICROALBUMINURIA REV: CPT | Mod: CPTII,,,

## 2023-08-08 PROCEDURE — 99396 PR PREVENTIVE VISIT,EST,40-64: ICD-10-PCS | Mod: S$PBB,,,

## 2023-08-08 PROCEDURE — 3044F PR MOST RECENT HEMOGLOBIN A1C LEVEL <7.0%: ICD-10-PCS | Mod: CPTII,,,

## 2023-08-08 PROCEDURE — 3077F PR MOST RECENT SYSTOLIC BLOOD PRESSURE >= 140 MM HG: ICD-10-PCS | Mod: CPTII,,,

## 2023-08-08 PROCEDURE — 1160F RVW MEDS BY RX/DR IN RCRD: CPT | Mod: CPTII,,,

## 2023-08-08 PROCEDURE — 3077F SYST BP >= 140 MM HG: CPT | Mod: CPTII,,,

## 2023-08-08 PROCEDURE — 1159F MED LIST DOCD IN RCRD: CPT | Mod: CPTII,,,

## 2023-08-08 PROCEDURE — 99396 PREV VISIT EST AGE 40-64: CPT | Mod: S$PBB,,,

## 2023-08-08 PROCEDURE — 3066F NEPHROPATHY DOC TX: CPT | Mod: CPTII,,,

## 2023-08-08 PROCEDURE — 99215 OFFICE O/P EST HI 40 MIN: CPT | Mod: PBBFAC

## 2023-08-08 PROCEDURE — 3044F HG A1C LEVEL LT 7.0%: CPT | Mod: CPTII,,,

## 2023-08-08 PROCEDURE — 1159F PR MEDICATION LIST DOCUMENTED IN MEDICAL RECORD: ICD-10-PCS | Mod: CPTII,,,

## 2023-08-08 PROCEDURE — 3080F PR MOST RECENT DIASTOLIC BLOOD PRESSURE >= 90 MM HG: ICD-10-PCS | Mod: CPTII,,,

## 2023-08-08 PROCEDURE — 3061F PR NEG MICROALBUMINURIA RESULT DOCUMENTED/REVIEW: ICD-10-PCS | Mod: CPTII,,,

## 2023-08-08 PROCEDURE — 3008F BODY MASS INDEX DOCD: CPT | Mod: CPTII,,,

## 2023-08-08 PROCEDURE — 3080F DIAST BP >= 90 MM HG: CPT | Mod: CPTII,,,

## 2023-08-08 RX ORDER — AMLODIPINE BESYLATE 10 MG/1
10 TABLET ORAL DAILY
Qty: 90 TABLET | Refills: 1 | Status: SHIPPED | OUTPATIENT
Start: 2023-08-08 | End: 2024-01-04 | Stop reason: SDUPTHER

## 2023-08-08 RX ORDER — ACETAMINOPHEN 500 MG
2000 TABLET ORAL DAILY
Qty: 90 CAPSULE | Refills: 1 | Status: SHIPPED | OUTPATIENT
Start: 2023-08-08 | End: 2024-01-04 | Stop reason: SDUPTHER

## 2023-08-08 NOTE — ASSESSMENT & PLAN NOTE
Dangers of cigarette smoking were reviewed with patient in detail. Patient was Counseled for 3 minutes, previously referred to smoking cessastion. Nicotine replacement options were discussed. Nicotine replacement was discussed- treatment deferred to smoking cessation department.

## 2023-08-08 NOTE — ASSESSMENT & PLAN NOTE
Lab Results   Component Value Date    [id number] 27.1 (L) 08/08/2023     Educated on increasing foods high in Vitamin D such as fish oil, cod liver oil, salmon, milk fortified with vitamin D.  Rx Vitamin D 2000 I.U. tablets daily.  Repeat Vitamin D level as ordered.

## 2023-08-08 NOTE — PROGRESS NOTES
"     PATIENT NAME: Adair Real  : 1960  DATE: 23  MRN: [id number]          Reason for Visit/Chief Complaint   Follow-up, Labs Only, Annual Exam, and Hypertension       History of Present Illness (HPI)     Adair Real is a 62 y.o. Black male presenting in clinic today for Follow-up, Labs Only, Annual Exam, and Hypertension. Previous PCP: Dr. Nathan Bender. PMH: HTN, Hep C (treated wit Epclusa for 12 weeks - undetectable), HLD, restless leg syndrome, neuropathy, smoker (cigarette and marijuana). He is followed by [facility] ID clinic. Was previously followed by [facility] in [place], [place] - states he only went to 1 appt. Most recent EKG on 2022 revealed Sinus rhythm with Premature atrial complexes, Nonspecific T wave abnormality. 2021 - NM TST: Normal ETT: No significant exercise induced ST changes. PACs during exercise and rest. Max HR: 141 bpm (88% of maximum  bpm). Functional capacity: Fair. BP response: normal. HR response: normal. Stopped s/t leg pain, fatigue, and desire.    BP-160/90 - not at goal. Denies CP, HA, dizziness, or vision changs. Experiences SOB at times. Presented BP log in clinic 129//98.    Smokes 1ppd since age 16. Smokes marijuana "a joint or 2 per day." Former alcohol drinker. Denies chest pain, cough, headache, dizziness, weakness, abdominal pain, nausea, vomiting, diarrhea, constipation, dysuria, depression, anxiety, SI, and HI.     Prostate Cancer Screening - PSA: 2022. PSA ordered. Follow up annually.   Colon Cancer Screening - FIT negative on 2020. Follow up annually. 2023-Cologuard positive - referred to [facility]  for colonoscopy.  Lung Cancer Screening- 2023: Low Dose CT-LungRads 2   Eye Exam -Several years. List of local eye doctors given to patient.   Dental Exam - Several years. List of local dentists given to patient.   Vaccinations: Flu - 2022 / Pneumococcal - 2023 / Shingles - 3/4/2020, 2020 / Tetanus - " 3/4/2020 / Covid - 6/22/2021, 7/30/2021    Review of Systems     Review of Systems   Constitutional: Negative.    HENT: Negative.     Eyes: Negative.    Respiratory: Negative.     Cardiovascular: Negative.  Negative for chest pain, palpitations and leg swelling.        Pain in legs   Gastrointestinal: Negative.    Endocrine: Negative.    Genitourinary: Negative.    Musculoskeletal: Negative.    Skin: Negative.    Allergic/Immunologic: Negative.    Neurological: Negative.    Hematological: Negative.    Psychiatric/Behavioral: Negative.     All other systems reviewed and are negative.      Medical / Social / Family History     Past Medical History:   Diagnosis Date    Hyperlipidemia     Hypertension     Unspecified viral hepatitis C without hepatic coma          Past Surgical History:   Procedure Laterality Date    FRACTURE SURGERY           Social History  Adair Leone  reports that he has been smoking cigarettes. He has quit using smokeless tobacco. He reports that he does not currently use alcohol. He reports current drug use. Drug: Marijuana.    Family History  Adair Leone family history includes No Known Problems in his brother, father, mother, and sister.    Medications and Allergies     Medications  Current Outpatient Medications   Medication Instructions    albuterol (PROVENTIL/VENTOLIN HFA) 90 mcg/actuation inhaler 1-2 puffs, Inhalation, Every 4 hours PRN    amLODIPine (NORVASC) 10 mg, Oral, Daily    aspirin (ECOTRIN) 81 mg, Oral, Daily    atorvastatin (LIPITOR) 20 mg, Oral, Nightly    blood pressure monitor Kit 1 each, Misc.(Non-Drug; Combo Route), 2 times daily    cholecalciferol (vitamin D3) (VITAMIN D3) 2,000 Units, Oral, Daily    gabapentin (NEURONTIN) 600 mg, Oral, 3 times daily    omeprazole (PRILOSEC) 20 mg, Oral, Daily    rOPINIRole (REQUIP) 1 mg, Oral, Nightly    tiZANidine (ZANAFLEX) 4 mg, Oral, Every 8 hours PRN    umeclidinium-vilanteroL (ANORO ELLIPTA) 62.5-25 mcg/actuation DsDv 1 puff,  "Inhalation, Daily       Allergies  Review of patient's allergies indicates:  No Known Allergies    Physical Examination   Visit Vitals  BP (!) 160/90 (BP Location: Right arm, Patient Position: Sitting, BP Method: Small (Manual))   Pulse (!) 56   Temp 98.4 °F (36.9 °C) (Oral)   Resp 18   Ht 6' 1" (1.854 m)   Wt 71.9 kg (158 lb 8.2 oz)   SpO2 98%   BMI 20.91 kg/m²     Physical Exam  Vitals reviewed.   Constitutional:       Appearance: Normal appearance. He is normal weight.   HENT:      Head: Normocephalic.      Nose: Nose normal.      Mouth/Throat:      Mouth: Mucous membranes are moist.      Pharynx: Oropharynx is clear.   Eyes:      Extraocular Movements: Extraocular movements intact.      Conjunctiva/sclera: Conjunctivae normal.      Pupils: Pupils are equal, round, and reactive to light.   Cardiovascular:      Rate and Rhythm: Normal rate and regular rhythm.      Heart sounds: Normal heart sounds.   Pulmonary:      Effort: Pulmonary effort is normal.      Breath sounds: Normal breath sounds.   Abdominal:      General: Abdomen is flat. Bowel sounds are normal.      Palpations: Abdomen is soft.   Musculoskeletal:         General: Normal range of motion.      Cervical back: Normal range of motion.   Skin:     General: Skin is warm and dry.      Capillary Refill: Capillary refill takes less than 2 seconds.   Neurological:      General: No focal deficit present.      Mental Status: He is alert and oriented to person, place, and time.   Psychiatric:         Mood and Affect: Mood normal.           Results     Lab Results   Component Value Date    WBC 6.80 08/08/2023    RBC 4.46 (L) 08/08/2023    HGB 13.3 (L) 08/08/2023    HCT 41.3 (L) 08/08/2023    MCV 92.6 08/08/2023    MCH 29.8 08/08/2023    MCHC 32.2 (L) 08/08/2023    RDW 13.1 08/08/2023     08/08/2023    MPV 9.7 08/08/2023      Lab Results   Component Value Date     08/08/2023    K 3.9 08/08/2023    CHLORIDE 108 (H) 08/08/2023    CO2 27 08/08/2023    " "GLUCOSE 100 08/08/2023    BUN 11.1 08/08/2023    CREATININE 0.87 08/08/2023    LABPROT 6.9 08/08/2023    ALBUMIN 3.7 08/08/2023    BILITOT 0.4 08/08/2023    ALKPHOS 61 08/08/2023    AST 21 08/08/2023    ALT 19 08/08/2023    EGFRNORACEVR >60 08/08/2023     Lab Results   Component Value Date    TSH 1.839 08/08/2023     Lab Results   Component Value Date    CHOL 126 08/08/2023    HDL 32 (L) 08/08/2023    LDL 80.00 08/08/2023    TRIG 69 08/08/2023     Lab Results   Component Value Date    COLORUA Yellow 08/08/2023    SGUA 1.022 08/08/2023    PROTEINUA Negative 08/08/2023    GLUCOSEUA Normal 08/08/2023    BILIRUBINUA Negative 08/08/2023    BLOODUA Negative 08/08/2023    WBCUA 0-5 08/08/2023    RBCUA 0-5 08/08/2023    BACTERIA None Seen 08/08/2023    NITRITESUA Negative 08/08/2023    LEUKOCYTESUR Negative 08/08/2023    UROBILINOGEN Normal 08/08/2023     Lab Results   Component Value Date    CREATRANDUR 178.1 (H) 08/08/2023    MICALBCREAT 6.3 08/08/2023     Lab Results   Component Value Date    [id number] 27.1 (L) 08/08/2023     Lab Results   Component Value Date    HIV Nonreactive 11/18/2022    HEPCAB Reactive 04/21/2022     Lab Results   Component Value Date    COLOGUARD Positive (A) 07/15/2023     No results found for: "OCCBLDIA"    Assessment and Plan (including Health Maintenance)     Problem List Items Addressed This Visit          Cardiac/Vascular    Primary hypertension    Current Assessment & Plan     BP Readings from Last 3 Encounters:   08/08/23 (!) 160/90   07/11/23 133/85   06/13/23 113/79      Not at goal.  Follow a low sodium (less than 2 grams of sodium per day), DASH diet.   Increase amlodipine to 10 mg daily.  RTC in 2 weeks for BP check.  Presented BP log in clinic 129//98.  Monitor blood pressure and report any consistent values greater than 140/90 and keep a log.  Encouraged smoking cessation to aid in BP reduction and co-morbidities.   Maintain healthy weight with a BMI goal of <30.   Aerobic " exercise for 150 minutes per week (or 5 days a week for 30 minutes each day).          Relevant Medications    amLODIPine (NORVASC) 10 MG tablet    Other Relevant Orders    CBC Auto Differential    Comprehensive Metabolic Panel    Lipid Panel    Microalbumin/Creatinine Ratio, Urine    Urinalysis, Reflex to Urine Culture       Endocrine    BMI 20.0-20.9, adult    Current Assessment & Plan     Body mass index is 20.91 kg/m². (At goal).          Vitamin D insufficiency    Current Assessment & Plan     Lab Results   Component Value Date    [id number] 27.1 (L) 08/08/2023   Educated on increasing foods high in Vitamin D such as fish oil, cod liver oil, salmon, milk fortified with vitamin D.  Rx Vitamin D 2000 I.U. tablets daily.  Repeat Vitamin D level as ordered.          Relevant Medications    cholecalciferol, vitamin D3, (VITAMIN D3) 50 mcg (2,000 unit) Cap capsule    Other Relevant Orders    Vitamin D       Other    Cigarette nicotine dependence, uncomplicated    Current Assessment & Plan     Dangers of cigarette smoking were reviewed with patient in detail. Patient was Counseled for 3 minutes, previously referred to smoking cessastion. Nicotine replacement options were discussed. Nicotine replacement was discussed- treatment deferred to smoking cessation department.         Well adult exam - Primary    Current Assessment & Plan     Wellness labs - 8/8/2023.  Prostate Cancer Screening - PSA: 4/21/2022. PSA ordered. Follow up annually.   Colon Cancer Screening - FIT negative on 12/21/2020. Follow up annually. 7/19/2023-Cologuard positive - referred to [facility]  for colonoscopy.  Lung Cancer Screening- 5/9/2023: Low Dose CT-LungRads 2   Eye Exam -Several years. List of local eye doctors given to patient.   Dental Exam - Several years. List of local dentists given to patient.   Vaccinations: Flu - 11/29/2022 / Pneumococcal - 2/2/2023 / Shingles - 3/4/2020, 7/28/2020 / Tetanus - 3/4/2020 / Covid - 6/22/2021,  7/30/2021               Health Maintenance Due   Topic Date Due    COVID-19 Vaccine (3 - Moderna series) 09/24/2021     Tests to Keep You Healthy    Colon Cancer Screening: Met on 7/15/2023  Last Blood Pressure <= 139/89 (8/8/2023): Yes  Tobacco Cessation: NO      Health Maintenance Topics with due status: Not Due       Topic Last Completion Date    TETANUS VACCINE 03/04/2020    Influenza Vaccine 11/29/2022    Colorectal Cancer Screening 07/15/2023    Lipid Panel 08/08/2023       Future Appointments   Date Time Provider Department Center   8/22/2023  2:00 PM Josephine Best, [facility] [place]   8/25/2023  9:00 AM PFT, [facility] PULMONARY FUNCTION SERVICES [facility]    8/25/2023 10:00 AM [facility] US4 VASC [facility]    8/28/2023  8:00 [facility]       Follow up in about 2 weeks (around 8/22/2023) for F2F, Follow up, HTN, Med check, RTC PRN, No labs due.          Signature:        GUS Mcguire  [facility]  [address] [facility] [address]    Date of encounter: 8/8/23

## 2023-08-08 NOTE — ASSESSMENT & PLAN NOTE
BP Readings from Last 3 Encounters:   08/08/23 (!) 160/90   07/11/23 133/85   06/13/23 113/79      Not at goal.  Follow a low sodium (less than 2 grams of sodium per day), DASH diet.   Increase amlodipine to 10 mg daily.  RTC in 2 weeks for BP check.  Presented BP log in clinic 129//98.  Monitor blood pressure and report any consistent values greater than 140/90 and keep a log.  Encouraged smoking cessation to aid in BP reduction and co-morbidities.   Maintain healthy weight with a BMI goal of <30.   Aerobic exercise for 150 minutes per week (or 5 days a week for 30 minutes each day).

## 2023-08-08 NOTE — ASSESSMENT & PLAN NOTE
Wellness labs - 8/8/2023.  Prostate Cancer Screening - PSA: 4/21/2022. PSA ordered. Follow up annually.   Colon Cancer Screening - FIT negative on 12/21/2020. Follow up annually. 7/19/2023-Cologuard positive - referred to [facility]  for colonoscopy.  Lung Cancer Screening- 5/9/2023: Low Dose CT-LungRads 2   Eye Exam -Several years. List of local eye doctors given to patient.   Dental Exam - Several years. List of local dentists given to patient.   Vaccinations: Flu - 11/29/2022 / Pneumococcal - 2/2/2023 / Shingles - 3/4/2020, 7/28/2020 / Tetanus - 3/4/2020 / Covid - 6/22/2021, 7/30/2021

## 2023-08-16 LAB
INR PPP: 1.1
PROTHROMBIN TIME: 13.5 SECONDS (ref 11.4–14)

## 2023-08-22 ENCOUNTER — TELEPHONE (OUTPATIENT)
Dept: SMOKING CESSATION | Facility: CLINIC | Age: 63
End: 2023-08-22
Payer: MEDICAID

## 2023-08-22 NOTE — TELEPHONE ENCOUNTER
Pt had not shown up for his [facility] appt.  Called and spoke with pt.  Pt stated that he is not feeling well and this location is too far for him to ride his bike.  Pt rescheduled to September 20, 2023 at 9 am at the [facility] location.

## 2023-08-25 ENCOUNTER — HOSPITAL ENCOUNTER (OUTPATIENT)
Dept: PULMONOLOGY | Facility: HOSPITAL | Age: 63
Discharge: HOME | End: 2023-08-25
Payer: MEDICAID

## 2023-08-25 DIAGNOSIS — R06.02 SHORTNESS OF BREATH: ICD-10-CM

## 2023-08-25 DIAGNOSIS — F17.210 CIGARETTE NICOTINE DEPENDENCE, UNCOMPLICATED: ICD-10-CM

## 2023-08-25 PROCEDURE — 94060 EVALUATION OF WHEEZING: CPT

## 2023-08-25 PROCEDURE — 94640 AIRWAY INHALATION TREATMENT: CPT

## 2023-08-26 LAB
FEF 25 75 CHG: 4.3 %
FEF 25 75 LLN: 1.07
FEF 25 75 POST REF: 32.2 %
FEF 25 75 PRE REF: 30.9 %
FEF 25 75 REF: 2.64
FET100 CHG: -0.3 %
FEV1 CHG: -4.7 %
FEV1 FVC CHG: -1.1 %
FEV1 FVC LLN: 65
FEV1 FVC POST REF: 63.1 %
FEV1 FVC PRE REF: 63.8 %
FEV1 FVC REF: 77
FEV1 LLN: 2.33
FEV1 POST REF: 56.4 %
FEV1 PRE REF: 59.2 %
FEV1 REF: 3.26
FRCPLETH LLN: 2.82
FRCPLETH PREREF: 26.9 %
FRCPLETH REF: 3.81
FVC CHG: -3.7 %
FVC LLN: 3.13
FVC POST REF: 89.1 %
FVC PRE REF: 92.5 %
FVC REF: 4.24
MVV LLN: 125
MVV PRE REF: 23.7 %
MVV REF: 147
PEF CHG: -8.5 %
PEF LLN: 6.21
PEF POST REF: 36.8 %
PEF PRE REF: 40.2 %
PEF REF: 9.09
POST FEF 25 75: 0.85 L/S (ref 1.07–4.93)
POST FET 100: 7.26 SEC
POST FEV1 FVC: 48.66 % (ref 65.27–87.59)
POST FEV1: 1.84 L (ref 2.33–4.13)
POST FVC: 3.78 L (ref 3.13–5.37)
POST PEF: 3.34 L/S (ref 6.21–11.98)
PRE FEF 25 75: 0.82 L/S (ref 1.07–4.93)
PRE FET 100: 7.28 SEC
PRE FEV1 FVC: 49.19 % (ref 65.27–87.59)
PRE FEV1: 1.93 L (ref 2.33–4.13)
PRE FRC PL: 1.02 L (ref 2.82–4.79)
PRE FVC: 3.92 L (ref 3.13–5.37)
PRE MVV: 35 L/MIN (ref 125.29–169.51)
PRE PEF: 3.65 L/S (ref 6.21–11.98)
VC LLN: 3.13
VC PRE REF: 98 %
VC PRE: 4.16 L (ref 3.13–5.37)
VC REF: 4.24

## 2023-08-27 PROBLEM — J44.9 CHRONIC OBSTRUCTIVE PULMONARY DISEASE: Status: ACTIVE | Noted: 2023-08-27

## 2023-08-28 ENCOUNTER — OFFICE VISIT (OUTPATIENT)
Dept: INTERNAL MEDICINE | Facility: CLINIC | Age: 63
End: 2023-08-28
Payer: MEDICAID

## 2023-08-28 VITALS
DIASTOLIC BLOOD PRESSURE: 69 MMHG | SYSTOLIC BLOOD PRESSURE: 104 MMHG | BODY MASS INDEX: 21.06 KG/M2 | OXYGEN SATURATION: 98 % | TEMPERATURE: 98 F | HEIGHT: 73 IN | WEIGHT: 158.94 LBS | HEART RATE: 64 BPM | RESPIRATION RATE: 20 BRPM

## 2023-08-28 DIAGNOSIS — G25.81 RESTLESS LEGS: ICD-10-CM

## 2023-08-28 DIAGNOSIS — F17.210 CIGARETTE NICOTINE DEPENDENCE, UNCOMPLICATED: ICD-10-CM

## 2023-08-28 DIAGNOSIS — J44.9 CHRONIC OBSTRUCTIVE PULMONARY DISEASE, UNSPECIFIED COPD TYPE: ICD-10-CM

## 2023-08-28 DIAGNOSIS — I10 PRIMARY HYPERTENSION: Primary | ICD-10-CM

## 2023-08-28 PROCEDURE — 3078F DIAST BP <80 MM HG: CPT | Mod: CPTII,,,

## 2023-08-28 PROCEDURE — 3061F PR NEG MICROALBUMINURIA RESULT DOCUMENTED/REVIEW: ICD-10-PCS | Mod: CPTII,,,

## 2023-08-28 PROCEDURE — 3066F NEPHROPATHY DOC TX: CPT | Mod: CPTII,,,

## 2023-08-28 PROCEDURE — 99214 PR OFFICE/OUTPT VISIT, EST, LEVL IV, 30-39 MIN: ICD-10-PCS | Mod: S$PBB,,,

## 2023-08-28 PROCEDURE — 3061F NEG MICROALBUMINURIA REV: CPT | Mod: CPTII,,,

## 2023-08-28 PROCEDURE — 3078F PR MOST RECENT DIASTOLIC BLOOD PRESSURE < 80 MM HG: ICD-10-PCS | Mod: CPTII,,,

## 2023-08-28 PROCEDURE — 3074F SYST BP LT 130 MM HG: CPT | Mod: CPTII,,,

## 2023-08-28 PROCEDURE — 3044F PR MOST RECENT HEMOGLOBIN A1C LEVEL <7.0%: ICD-10-PCS | Mod: CPTII,,,

## 2023-08-28 PROCEDURE — 1159F MED LIST DOCD IN RCRD: CPT | Mod: CPTII,,,

## 2023-08-28 PROCEDURE — 3066F PR DOCUMENTATION OF TREATMENT FOR NEPHROPATHY: ICD-10-PCS | Mod: CPTII,,,

## 2023-08-28 PROCEDURE — 1160F PR REVIEW ALL MEDS BY PRESCRIBER/CLIN PHARMACIST DOCUMENTED: ICD-10-PCS | Mod: CPTII,,,

## 2023-08-28 PROCEDURE — 3044F HG A1C LEVEL LT 7.0%: CPT | Mod: CPTII,,,

## 2023-08-28 PROCEDURE — 3008F PR BODY MASS INDEX (BMI) DOCUMENTED: ICD-10-PCS | Mod: CPTII,,,

## 2023-08-28 PROCEDURE — 3074F PR MOST RECENT SYSTOLIC BLOOD PRESSURE < 130 MM HG: ICD-10-PCS | Mod: CPTII,,,

## 2023-08-28 PROCEDURE — 3008F BODY MASS INDEX DOCD: CPT | Mod: CPTII,,,

## 2023-08-28 PROCEDURE — 99215 OFFICE O/P EST HI 40 MIN: CPT | Mod: PBBFAC

## 2023-08-28 PROCEDURE — 99214 OFFICE O/P EST MOD 30 MIN: CPT | Mod: S$PBB,,,

## 2023-08-28 PROCEDURE — 1160F RVW MEDS BY RX/DR IN RCRD: CPT | Mod: CPTII,,,

## 2023-08-28 PROCEDURE — 1159F PR MEDICATION LIST DOCUMENTED IN MEDICAL RECORD: ICD-10-PCS | Mod: CPTII,,,

## 2023-08-28 RX ORDER — UMECLIDINIUM BROMIDE AND VILANTEROL TRIFENATATE 62.5; 25 UG/1; UG/1
1 POWDER RESPIRATORY (INHALATION) DAILY
Qty: 60 EACH | Refills: 1 | Status: SHIPPED | OUTPATIENT
Start: 2023-08-28 | End: 2024-01-04 | Stop reason: SDUPTHER

## 2023-08-28 RX ORDER — ROPINIROLE 2 MG/1
2 TABLET, FILM COATED ORAL NIGHTLY
Qty: 90 TABLET | Refills: 1 | Status: SHIPPED | OUTPATIENT
Start: 2023-08-28 | End: 2024-01-04 | Stop reason: SDUPTHER

## 2023-08-28 RX ORDER — ALBUTEROL SULFATE 90 UG/1
1-2 AEROSOL, METERED RESPIRATORY (INHALATION) EVERY 4 HOURS PRN
Qty: 18 G | Refills: 1 | Status: SHIPPED | OUTPATIENT
Start: 2023-08-28 | End: 2023-12-29 | Stop reason: SDUPTHER

## 2023-08-28 NOTE — ASSESSMENT & PLAN NOTE
Therapeutic exercise (stretching, flexing and warm baths) encouraged as well decreased caffeine, alcohol and tobacco use.  Increase Requip to 2 mg.

## 2023-08-28 NOTE — PROGRESS NOTES
"     PATIENT NAME: Adair Real  : 1960  DATE: 23  MRN: [id number]          Reason for Visit/Chief Complaint   Follow-up, Hypertension, and COPD       History of Present Illness (HPI)     Adair Real is a 62 y.o. Black male presenting in clinic today for Follow-up, Hypertension, and COPD. Previous PCP: Dr. Nathan Bender. PMH: HTN, Hep C (treated wit Epclusa for 12 weeks - undetectable), HLD, restless leg syndrome, neuropathy, smoker (cigarette and marijuana). He is followed by [facility] ID clinic. Was previously followed by [facility] in [place], [place] - states he only went to 1 appt. Most recent EKG on 2022 revealed Sinus rhythm with Premature atrial complexes, Nonspecific T wave abnormality. 2021 - NM TST: Normal ETT: No significant exercise induced ST changes. PACs during exercise and rest. Max HR: 141 bpm (88% of maximum  bpm). Functional capacity: Fair. BP response: normal. HR response: normal. Stopped s/t leg pain, fatigue, and desire.     BP-104/69 - at goal. Denies CP, HA, dizziness, or vision changs. Experiences SOB at times.     PFT on 2023 reveals spirometry Data Acceptable and Reproducible. Patient struggled throughout testing with all exercises and unable to perform (Lung Volumes, and DLCO) Bronchodilator given with Albuterol and 0.5mg of Ipratropium HR 62/ Patient tolerated testing with no ill effects noted. Moderate obstruction. No significant bronchodilator response. Vital capacity is normal. Has been using rescue inhaler twice daily.     Requesting increase in requip, states he is still experiencing night time leg cramps and restlessness in legs. Smokes 1ppd since age 16. Smokes marijuana "a joint or 2 per day." Former alcohol drinker. Denies chest pain, cough, headache, dizziness, weakness, abdominal pain, nausea, vomiting, diarrhea, constipation, dysuria, depression, anxiety, SI, and HI.         Review of Systems     Review of Systems   Constitutional: Negative.  "   HENT: Negative.     Eyes: Negative.    Respiratory:  Positive for shortness of breath. Negative for wheezing.    Cardiovascular: Negative.  Negative for chest pain, palpitations and leg swelling.        Pain in legs   Gastrointestinal: Negative.    Endocrine: Negative.    Genitourinary: Negative.    Musculoskeletal: Negative.    Skin: Negative.    Allergic/Immunologic: Negative.    Neurological: Negative.    Hematological: Negative.    Psychiatric/Behavioral: Negative.     All other systems reviewed and are negative.      Medical / Social / Family History     Past Medical History:   Diagnosis Date    Hyperlipidemia     Hypertension     Unspecified viral hepatitis C without hepatic coma          Past Surgical History:   Procedure Laterality Date    FRACTURE SURGERY           Social History  Adair Leone  reports that he has been smoking cigarettes. He has quit using smokeless tobacco. He reports that he does not currently use alcohol. He reports current drug use. Drug: Marijuana.    Family History  Adair Leone family history includes No Known Problems in his brother, father, mother, and sister.    Medications and Allergies     Medications  Current Outpatient Medications   Medication Instructions    albuterol (PROVENTIL/VENTOLIN HFA) 90 mcg/actuation inhaler 1-2 puffs, Inhalation, Every 4 hours PRN    amLODIPine (NORVASC) 10 mg, Oral, Daily    aspirin (ECOTRIN) 81 mg, Oral, Daily    atorvastatin (LIPITOR) 20 mg, Oral, Nightly    blood pressure monitor Kit 1 each, Misc.(Non-Drug; Combo Route), 2 times daily    cholecalciferol (vitamin D3) (VITAMIN D3) 2,000 Units, Oral, Daily    gabapentin (NEURONTIN) 600 mg, Oral, 3 times daily    omeprazole (PRILOSEC) 20 mg, Oral, Daily    rOPINIRole (REQUIP) 2 mg, Oral, Nightly    tiZANidine (ZANAFLEX) 4 mg, Oral, Every 8 hours PRN    umeclidinium-vilanteroL (ANORO ELLIPTA) 62.5-25 mcg/actuation DsDv 1 puff, Inhalation, Daily       Allergies  Review of patient's allergies  "indicates:  No Known Allergies    Physical Examination   Visit Vitals  /69 (BP Location: Right arm, Patient Position: Sitting, BP Method: Large (Automatic))   Pulse 64   Temp 98 °F (36.7 °C)   Resp 20   Ht 6' 1" (1.854 m)   Wt 72.1 kg (158 lb 15.2 oz)   SpO2 98%   BMI 20.97 kg/m²       Physical Exam  Vitals reviewed.   Constitutional:       Appearance: Normal appearance. He is normal weight.   HENT:      Head: Normocephalic.      Nose: Nose normal.      Mouth/Throat:      Mouth: Mucous membranes are moist.      Pharynx: Oropharynx is clear.   Eyes:      Extraocular Movements: Extraocular movements intact.      Conjunctiva/sclera: Conjunctivae normal.      Pupils: Pupils are equal, round, and reactive to light.   Cardiovascular:      Rate and Rhythm: Normal rate and regular rhythm.      Heart sounds: Normal heart sounds.   Pulmonary:      Effort: Pulmonary effort is normal.      Breath sounds: Normal breath sounds.   Abdominal:      General: Abdomen is flat. Bowel sounds are normal.      Palpations: Abdomen is soft.   Musculoskeletal:         General: Normal range of motion.      Cervical back: Normal range of motion.   Skin:     General: Skin is warm and dry.      Capillary Refill: Capillary refill takes less than 2 seconds.   Neurological:      General: No focal deficit present.      Mental Status: He is alert and oriented to person, place, and time.   Psychiatric:         Mood and Affect: Mood normal.           Results     Lab Results   Component Value Date    WBC 6.80 08/08/2023    RBC 4.46 (L) 08/08/2023    HGB 13.3 (L) 08/08/2023    HCT 41.3 (L) 08/08/2023    MCV 92.6 08/08/2023    MCH 29.8 08/08/2023    MCHC 32.2 (L) 08/08/2023    RDW 13.1 08/08/2023     08/08/2023    MPV 9.7 08/08/2023      Lab Results   Component Value Date     08/08/2023    K 3.9 08/08/2023    CHLORIDE 108 (H) 08/08/2023    CO2 27 08/08/2023    GLUCOSE 100 08/08/2023    BUN 11.1 08/08/2023    CREATININE 0.87 08/08/2023    " "LABPROT 6.9 08/08/2023    ALBUMIN 3.7 08/08/2023    BILITOT 0.4 08/08/2023    ALKPHOS 61 08/08/2023    AST 21 08/08/2023    ALT 19 08/08/2023    EGFRNORACEVR >60 08/08/2023     Lab Results   Component Value Date    TSH 1.839 08/08/2023     Lab Results   Component Value Date    CHOL 126 08/08/2023    HDL 32 (L) 08/08/2023    LDL 80.00 08/08/2023    TRIG 69 08/08/2023     Lab Results   Component Value Date    COLORUA Yellow 08/08/2023    SGUA 1.022 08/08/2023    PROTEINUA Negative 08/08/2023    GLUCOSEUA Normal 08/08/2023    BILIRUBINUA Negative 08/08/2023    BLOODUA Negative 08/08/2023    WBCUA 0-5 08/08/2023    RBCUA 0-5 08/08/2023    BACTERIA None Seen 08/08/2023    NITRITESUA Negative 08/08/2023    LEUKOCYTESUR Negative 08/08/2023    UROBILINOGEN Normal 08/08/2023     Lab Results   Component Value Date    CREATRANDUR 178.1 (H) 08/08/2023    MICALBCREAT 6.3 08/08/2023     Lab Results   Component Value Date    [id number] 27.1 (L) 08/08/2023     Lab Results   Component Value Date    HIV Nonreactive 11/18/2022    HEPCAB Reactive 04/21/2022     Lab Results   Component Value Date    COLOGUARD Positive (A) 07/15/2023     No results found for: "OCCBLDIA"    Assessment and Plan (including Health Maintenance)     Problem List Items Addressed This Visit          Neuro    Restless legs    Current Assessment & Plan     Therapeutic exercise (stretching, flexing and warm baths) encouraged as well decreased caffeine, alcohol and tobacco use.  Increase Requip to 2 mg.         Relevant Medications    rOPINIRole (REQUIP) 2 MG tablet       Pulmonary    Chronic obstructive pulmonary disease    Overview     8/25/2023 PFT: Spirometry Data Acceptable and Reproducible. Patient struggled throughout testing with all exercises and unable to perform (Lung Volumes, and DLCO) Bronchodilator given with Albuterol and 0.5mg of Ipratropium HR 62/62 Patient tolerated testing with no ill effects noted. Moderate obstruction. No significant " bronchodilator response. Vital capacity is normal.  Referral to [facility] pulmonology clinic.  Continue albuterol inhaler - refilled today.         Current Assessment & Plan     Last PFT-  8/25/2023 / Last CXR- 4/21/2022  Referral to [facility] pulmonary clinic to eval and treat.  Use inhalers as prescribed (rinse mouth after use of steroid inhalers).    Use long term inhalers daily and rescue inhaler as needed.    Avoid triggers (high humidity, strong odors, chemical fumes).    Report signs of upper respiratory infection as soon as possible for early treatment.    Practice/encouraged abdominal breathing.   Eat smaller, more frequent meals.   Flu shot recommended yearly.    Smoking cessation encouraged          Relevant Medications    umeclidinium-vilanteroL (ANORO ELLIPTA) 62.5-25 mcg/actuation DsDv    albuterol (PROVENTIL/VENTOLIN HFA) 90 mcg/actuation inhaler       Cardiac/Vascular    Primary hypertension - Primary    Current Assessment & Plan     BP Readings from Last 3 Encounters:   08/28/23 104/69   08/08/23 (!) 160/90   07/11/23 133/85      Not at goal.  Follow a low sodium (less than 2 grams of sodium per day), DASH diet.   Continue amlodipine as prescribed.  Monitor blood pressure and report any consistent values greater than 140/90 and keep a log.  Encouraged smoking cessation to aid in BP reduction and co-morbidities.   Maintain healthy weight with a BMI goal of <30.   Aerobic exercise for 150 minutes per week (or 5 days a week for 30 minutes each day).             Endocrine    BMI 20.0-20.9, adult    Current Assessment & Plan     Body mass index is 20.97 kg/m². (At goal).             Other    Cigarette nicotine dependence, uncomplicated    Current Assessment & Plan     Dangers of cigarette smoking were reviewed with patient in detail. Patient was Counseled for 3 minutes, previously referred to smoking cessastion. Nicotine replacement options were discussed. Nicotine replacement was discussed- treatment deferred to  smoking cessation department.                 Health Maintenance Due   Topic Date Due    COVID-19 Vaccine (3 - Moderna series) 09/24/2021     Tests to Keep You Healthy    Colon Cancer Screening: Met on 7/15/2023  Last Blood Pressure <= 139/89 (8/28/2023): NO  Tobacco Cessation: NO      Health Maintenance Topics with due status: Not Due       Topic Last Completion Date    TETANUS VACCINE 03/04/2020    Influenza Vaccine 11/29/2022    Colorectal Cancer Screening 07/15/2023    Lipid Panel 08/08/2023       Future Appointments   Date Time Provider Department Center   9/20/2023  9:00 AM Josephine Best, CTTS [facility]   1/4/2024  8:00 AM Shu Coon FNP [facility]      Follow up in about 4 months (around 12/28/2023) for F2F, Follow up, Med check, Lab review, RTC PRN.          Signature:        GUS Mcguire  [facility]  [address] [facility] [address] [address]    Date of encounter: 8/28/23

## 2023-08-28 NOTE — ASSESSMENT & PLAN NOTE
BP Readings from Last 3 Encounters:   08/28/23 104/69   08/08/23 (!) 160/90   07/11/23 133/85      Not at goal.  Follow a low sodium (less than 2 grams of sodium per day), DASH diet.   Continue amlodipine as prescribed.  Monitor blood pressure and report any consistent values greater than 140/90 and keep a log.  Encouraged smoking cessation to aid in BP reduction and co-morbidities.   Maintain healthy weight with a BMI goal of <30.   Aerobic exercise for 150 minutes per week (or 5 days a week for 30 minutes each day).

## 2023-08-28 NOTE — ASSESSMENT & PLAN NOTE
Last PFT-  8/25/2023 / Last CXR- 4/21/2022  Referral to [facility] pulmonary clinic to eval and treat.  Use inhalers as prescribed (rinse mouth after use of steroid inhalers).    Use long term inhalers daily and rescue inhaler as needed.    Avoid triggers (high humidity, strong odors, chemical fumes).    Report signs of upper respiratory infection as soon as possible for early treatment.    Practice/encouraged abdominal breathing.   Eat smaller, more frequent meals.   Flu shot recommended yearly.    Smoking cessation encouraged

## 2023-09-20 ENCOUNTER — TELEPHONE (OUTPATIENT)
Dept: SMOKING CESSATION | Facility: CLINIC | Age: 63
End: 2023-09-20
Payer: MEDICAID

## 2023-09-20 NOTE — TELEPHONE ENCOUNTER
Pt had not shown up for his [facility] appt.  Called pt.  Message states that this number is not reachable.  Unable to leave a message.

## 2023-10-16 ENCOUNTER — TELEPHONE (OUTPATIENT)
Dept: SMOKING CESSATION | Facility: CLINIC | Age: 63
End: 2023-10-16
Payer: MEDICAID

## 2023-11-13 PROBLEM — Z00.00 WELL ADULT EXAM: Status: RESOLVED | Noted: 2023-08-08 | Resolved: 2023-11-13

## 2023-11-14 ENCOUNTER — TELEPHONE (OUTPATIENT)
Dept: SMOKING CESSATION | Facility: CLINIC | Age: 63
End: 2023-11-14
Payer: MEDICAID

## 2023-11-15 NOTE — TELEPHONE ENCOUNTER
Called pt to remind him of his smoking cessation appointment tomorrow morning.  Pt stated that he will busy and will call us back to reschedule.

## 2023-12-29 DIAGNOSIS — J44.9 CHRONIC OBSTRUCTIVE PULMONARY DISEASE, UNSPECIFIED COPD TYPE: ICD-10-CM

## 2023-12-29 RX ORDER — ALBUTEROL SULFATE 90 UG/1
1-2 AEROSOL, METERED RESPIRATORY (INHALATION) EVERY 4 HOURS PRN
Qty: 18 G | Refills: 1 | Status: SHIPPED | OUTPATIENT
Start: 2023-12-29 | End: 2024-03-22 | Stop reason: SDUPTHER

## 2024-01-02 ENCOUNTER — LAB VISIT (OUTPATIENT)
Dept: LAB | Facility: HOSPITAL | Age: 64
End: 2024-01-02
Payer: MEDICAID

## 2024-01-02 DIAGNOSIS — E55.9 VITAMIN D INSUFFICIENCY: ICD-10-CM

## 2024-01-02 DIAGNOSIS — I10 PRIMARY HYPERTENSION: ICD-10-CM

## 2024-01-02 LAB
ALBUMIN SERPL-MCNC: 3.8 G/DL (ref 3.4–4.8)
ALBUMIN/GLOB SERPL: 1.1 RATIO (ref 1.1–2)
ALP SERPL-CCNC: 76 UNIT/L (ref 40–150)
ALT SERPL-CCNC: 21 UNIT/L (ref 0–55)
APPEARANCE UR: CLEAR
AST SERPL-CCNC: 26 UNIT/L (ref 5–34)
BACTERIA #/AREA URNS AUTO: NORMAL /HPF
BASOPHILS # BLD AUTO: 0.05 X10(3)/MCL
BASOPHILS NFR BLD AUTO: 1.1 %
BILIRUB SERPL-MCNC: 0.5 MG/DL
BILIRUB UR QL STRIP.AUTO: NEGATIVE
BUN SERPL-MCNC: 8.4 MG/DL (ref 8.4–25.7)
CALCIUM SERPL-MCNC: 9.3 MG/DL (ref 8.8–10)
CHLORIDE SERPL-SCNC: 105 MMOL/L (ref 98–107)
CHOLEST SERPL-MCNC: 134 MG/DL
CHOLEST/HDLC SERPL: 3 {RATIO} (ref 0–5)
CO2 SERPL-SCNC: 29 MMOL/L (ref 23–31)
COLOR UR AUTO: NORMAL
CREAT SERPL-MCNC: 1.12 MG/DL (ref 0.73–1.18)
CREAT UR-MCNC: 139.8 MG/DL (ref 63–166)
DEPRECATED CALCIDIOL+CALCIFEROL SERPL-MC: 22.7 NG/ML (ref 30–80)
EOSINOPHIL # BLD AUTO: 0.23 X10(3)/MCL (ref 0–0.9)
EOSINOPHIL NFR BLD AUTO: 5.1 %
ERYTHROCYTE [DISTWIDTH] IN BLOOD BY AUTOMATED COUNT: 12.8 % (ref 11.5–17)
GFR SERPLBLD CREATININE-BSD FMLA CKD-EPI: >60 MLS/MIN/1.73/M2
GLOBULIN SER-MCNC: 3.4 GM/DL (ref 2.4–3.5)
GLUCOSE SERPL-MCNC: 95 MG/DL (ref 82–115)
GLUCOSE UR QL STRIP.AUTO: NORMAL
HCT VFR BLD AUTO: 45.1 % (ref 42–52)
HDLC SERPL-MCNC: 44 MG/DL (ref 35–60)
HGB BLD-MCNC: 14.3 G/DL (ref 14–18)
HYALINE CASTS #/AREA URNS LPF: NORMAL /LPF
IMM GRANULOCYTES # BLD AUTO: 0.01 X10(3)/MCL (ref 0–0.04)
IMM GRANULOCYTES NFR BLD AUTO: 0.2 %
KETONES UR QL STRIP.AUTO: NEGATIVE
LDLC SERPL CALC-MCNC: 79 MG/DL (ref 50–140)
LEUKOCYTE ESTERASE UR QL STRIP.AUTO: NEGATIVE
LYMPHOCYTES # BLD AUTO: 1.66 X10(3)/MCL (ref 0.6–4.6)
LYMPHOCYTES NFR BLD AUTO: 36.7 %
MCH RBC QN AUTO: 29.5 PG (ref 27–31)
MCHC RBC AUTO-ENTMCNC: 31.7 G/DL (ref 33–36)
MCV RBC AUTO: 93.2 FL (ref 80–94)
MICROALBUMIN UR-MCNC: 16.8 UG/ML
MICROALBUMIN/CREAT RATIO PNL UR: 12 MG/GM CR (ref 0–30)
MONOCYTES # BLD AUTO: 0.45 X10(3)/MCL (ref 0.1–1.3)
MONOCYTES NFR BLD AUTO: 10 %
NEUTROPHILS # BLD AUTO: 2.12 X10(3)/MCL (ref 2.1–9.2)
NEUTROPHILS NFR BLD AUTO: 46.9 %
NITRITE UR QL STRIP.AUTO: NEGATIVE
NRBC BLD AUTO-RTO: 0 %
PH UR STRIP.AUTO: 6.5 [PH]
PLATELET # BLD AUTO: 204 X10(3)/MCL (ref 130–400)
PMV BLD AUTO: 9.8 FL (ref 7.4–10.4)
POTASSIUM SERPL-SCNC: 3.9 MMOL/L (ref 3.5–5.1)
PROT SERPL-MCNC: 7.2 GM/DL (ref 5.8–7.6)
PROT UR QL STRIP.AUTO: NEGATIVE
RBC # BLD AUTO: 4.84 X10(6)/MCL (ref 4.7–6.1)
RBC #/AREA URNS AUTO: NORMAL /HPF
RBC UR QL AUTO: NEGATIVE
SODIUM SERPL-SCNC: 141 MMOL/L (ref 136–145)
SP GR UR STRIP.AUTO: 1.02 (ref 1–1.03)
SQUAMOUS #/AREA URNS LPF: NORMAL /HPF
TRIGL SERPL-MCNC: 54 MG/DL (ref 34–140)
UROBILINOGEN UR STRIP-ACNC: NORMAL
VLDLC SERPL CALC-MCNC: 11 MG/DL
WBC # SPEC AUTO: 4.52 X10(3)/MCL (ref 4.5–11.5)
WBC #/AREA URNS AUTO: NORMAL /HPF

## 2024-01-02 PROCEDURE — 82043 UR ALBUMIN QUANTITATIVE: CPT

## 2024-01-02 PROCEDURE — 85025 COMPLETE CBC W/AUTO DIFF WBC: CPT

## 2024-01-02 PROCEDURE — 80053 COMPREHEN METABOLIC PANEL: CPT

## 2024-01-02 PROCEDURE — 82306 VITAMIN D 25 HYDROXY: CPT

## 2024-01-02 PROCEDURE — 81015 MICROSCOPIC EXAM OF URINE: CPT

## 2024-01-02 PROCEDURE — 36415 COLL VENOUS BLD VENIPUNCTURE: CPT

## 2024-01-02 PROCEDURE — 80061 LIPID PANEL: CPT

## 2024-01-04 ENCOUNTER — OFFICE VISIT (OUTPATIENT)
Dept: INTERNAL MEDICINE | Facility: CLINIC | Age: 64
End: 2024-01-04
Payer: MEDICAID

## 2024-01-04 VITALS
SYSTOLIC BLOOD PRESSURE: 118 MMHG | DIASTOLIC BLOOD PRESSURE: 80 MMHG | BODY MASS INDEX: 21.2 KG/M2 | HEIGHT: 73 IN | RESPIRATION RATE: 18 BRPM | TEMPERATURE: 98 F | WEIGHT: 160 LBS | HEART RATE: 76 BPM | OXYGEN SATURATION: 97 %

## 2024-01-04 DIAGNOSIS — G25.81 RESTLESS LEGS: ICD-10-CM

## 2024-01-04 DIAGNOSIS — E78.00 HYPERCHOLESTEROLEMIA: ICD-10-CM

## 2024-01-04 DIAGNOSIS — E55.9 VITAMIN D INSUFFICIENCY: ICD-10-CM

## 2024-01-04 DIAGNOSIS — R19.5 POSITIVE COLORECTAL CANCER SCREENING USING COLOGUARD TEST: ICD-10-CM

## 2024-01-04 DIAGNOSIS — I10 PRIMARY HYPERTENSION: Primary | ICD-10-CM

## 2024-01-04 DIAGNOSIS — J44.9 CHRONIC OBSTRUCTIVE PULMONARY DISEASE, UNSPECIFIED COPD TYPE: ICD-10-CM

## 2024-01-04 DIAGNOSIS — K21.9 GASTROESOPHAGEAL REFLUX DISEASE WITHOUT ESOPHAGITIS: ICD-10-CM

## 2024-01-04 DIAGNOSIS — Z13.0 SCREENING FOR IRON DEFICIENCY ANEMIA: ICD-10-CM

## 2024-01-04 DIAGNOSIS — Z23 IMMUNIZATION DUE: ICD-10-CM

## 2024-01-04 DIAGNOSIS — Z13.1 SCREENING FOR DIABETES MELLITUS: ICD-10-CM

## 2024-01-04 DIAGNOSIS — N52.8 OTHER MALE ERECTILE DYSFUNCTION: ICD-10-CM

## 2024-01-04 DIAGNOSIS — F17.210 CIGARETTE NICOTINE DEPENDENCE, UNCOMPLICATED: ICD-10-CM

## 2024-01-04 PROBLEM — R06.02 SHORTNESS OF BREATH: Status: RESOLVED | Noted: 2023-07-11 | Resolved: 2024-01-04

## 2024-01-04 PROCEDURE — 1159F MED LIST DOCD IN RCRD: CPT | Mod: CPTII,,,

## 2024-01-04 PROCEDURE — 90471 IMMUNIZATION ADMIN: CPT | Mod: PBBFAC

## 2024-01-04 PROCEDURE — 3008F BODY MASS INDEX DOCD: CPT | Mod: CPTII,,,

## 2024-01-04 PROCEDURE — 3061F NEG MICROALBUMINURIA REV: CPT | Mod: CPTII,,,

## 2024-01-04 PROCEDURE — 3074F SYST BP LT 130 MM HG: CPT | Mod: CPTII,,,

## 2024-01-04 PROCEDURE — 99215 OFFICE O/P EST HI 40 MIN: CPT | Mod: PBBFAC

## 2024-01-04 PROCEDURE — 3066F NEPHROPATHY DOC TX: CPT | Mod: CPTII,,,

## 2024-01-04 PROCEDURE — 1160F RVW MEDS BY RX/DR IN RCRD: CPT | Mod: CPTII,,,

## 2024-01-04 PROCEDURE — 99214 OFFICE O/P EST MOD 30 MIN: CPT | Mod: 25,S$PBB,,

## 2024-01-04 PROCEDURE — 3079F DIAST BP 80-89 MM HG: CPT | Mod: CPTII,,,

## 2024-01-04 PROCEDURE — 99406 BEHAV CHNG SMOKING 3-10 MIN: CPT | Mod: S$PBB,,,

## 2024-01-04 PROCEDURE — 90686 IIV4 VACC NO PRSV 0.5 ML IM: CPT | Mod: PBBFAC

## 2024-01-04 RX ORDER — ACETAMINOPHEN 500 MG
2000 TABLET ORAL DAILY
Qty: 90 CAPSULE | Refills: 1 | Status: SHIPPED | OUTPATIENT
Start: 2024-01-04 | End: 2024-01-04

## 2024-01-04 RX ORDER — UMECLIDINIUM BROMIDE AND VILANTEROL TRIFENATATE 62.5; 25 UG/1; UG/1
1 POWDER RESPIRATORY (INHALATION) DAILY
Qty: 60 EACH | Refills: 1 | Status: SHIPPED | OUTPATIENT
Start: 2024-01-04 | End: 2025-01-03

## 2024-01-04 RX ORDER — ASPIRIN 81 MG/1
81 TABLET ORAL DAILY
Qty: 90 TABLET | Refills: 1 | Status: SHIPPED | OUTPATIENT
Start: 2024-01-04 | End: 2025-01-03

## 2024-01-04 RX ORDER — ASPIRIN 325 MG
50000 TABLET, DELAYED RELEASE (ENTERIC COATED) ORAL
Qty: 12 CAPSULE | Refills: 0 | Status: SHIPPED | OUTPATIENT
Start: 2024-01-04 | End: 2025-01-03

## 2024-01-04 RX ORDER — AMLODIPINE BESYLATE 10 MG/1
10 TABLET ORAL DAILY
Qty: 90 TABLET | Refills: 1 | Status: SHIPPED | OUTPATIENT
Start: 2024-01-04 | End: 2025-01-03

## 2024-01-04 RX ORDER — SILDENAFIL 25 MG/1
25 TABLET, FILM COATED ORAL DAILY PRN
Qty: 30 TABLET | Refills: 2 | Status: SHIPPED | OUTPATIENT
Start: 2024-01-04 | End: 2025-01-03

## 2024-01-04 RX ORDER — ATORVASTATIN CALCIUM 20 MG/1
20 TABLET, FILM COATED ORAL NIGHTLY
Qty: 90 TABLET | Refills: 1 | Status: SHIPPED | OUTPATIENT
Start: 2024-01-04 | End: 2025-01-03

## 2024-01-04 RX ORDER — ROPINIROLE 2 MG/1
2 TABLET, FILM COATED ORAL NIGHTLY
Qty: 90 TABLET | Refills: 1 | Status: SHIPPED | OUTPATIENT
Start: 2024-01-04 | End: 2025-01-03

## 2024-01-04 RX ORDER — TIZANIDINE 4 MG/1
4 TABLET ORAL EVERY 8 HOURS PRN
Qty: 60 TABLET | Refills: 1 | Status: SHIPPED | OUTPATIENT
Start: 2024-01-04 | End: 2025-01-03

## 2024-01-04 RX ORDER — OMEPRAZOLE 20 MG/1
20 CAPSULE, DELAYED RELEASE ORAL DAILY
Qty: 90 CAPSULE | Refills: 1 | Status: SHIPPED | OUTPATIENT
Start: 2024-01-04 | End: 2025-01-03

## 2024-01-04 RX ADMIN — INFLUENZA VIRUS VACCINE 0.5 ML: 15; 15; 15; 15 SUSPENSION INTRAMUSCULAR at 08:01

## 2024-01-04 NOTE — ASSESSMENT & PLAN NOTE
Lab Results   Component Value Date    [id number] 22.7 (L) 01/02/2024     Educated on increasing foods high in Vitamin D such as fish oil, cod liver oil, salmon, milk fortified with vitamin D.  Rx vitamin D3 50,000 iu weekly.

## 2024-01-04 NOTE — ASSESSMENT & PLAN NOTE
Rx sildenafil - Good Rx card provided to patient.  Testosterone panel ordered.  Report to the ED immediately if you experience any of the following symptoms:  chest pain, lightheadedness, dizziness, SOB, hypotension, nausea, vomiting, or an erection that lasts longer than 4 hours.   Stop taking the medicine immediately!

## 2024-01-04 NOTE — ASSESSMENT & PLAN NOTE
Therapeutic exercise (stretching, flexing and warm baths) encouraged as well decreased caffeine, alcohol and tobacco use.  Continue Requip and tizanidine as prescribed - refilled today.

## 2024-01-04 NOTE — ASSESSMENT & PLAN NOTE
Previously referred to endo , but was placed incorrectly.  Referral input clarified.  Referral placed to [facility] endo  for colonoscopy.

## 2024-01-04 NOTE — ASSESSMENT & PLAN NOTE
Dangers of cigarette smoking were reviewed with patient in detail. Patient was Counseled for 3 minutes, previously referred to smoking cessation, but has been a no show for each scheduled appt. Nicotine replacement options were discussed. Nicotine replacement was discussed-  treatment deferred to smoking cessation department.

## 2024-01-04 NOTE — ASSESSMENT & PLAN NOTE
Lab Results   Component Value Date    LDL 79.00 01/02/2024       Lab Results   Component Value Date    TRIG 54 01/02/2024       Lab Results   Component Value Date    HDL 44 01/02/2024        Lab Results   Component Value Date    CHOL 134 01/02/2024      Continue atorvastatin as prescribed - refilled today.   Follow a low cholesterol, low saturated fat diet with less than 200 mg of cholesterol a day.   Avoid fried foods and high saturated fats.  Add flax seed or fish oil supplements to diet.   Increase dietary fiber.   Regular exercise improves cholesterol levels.  Physical activity 5 times a week for 30 minutes per day (or 150 minutes per week).   Stressed importance of dietary modifications.

## 2024-01-04 NOTE — ASSESSMENT & PLAN NOTE
BP Readings from Last 3 Encounters:   01/04/24 118/80   08/28/23 104/69   08/08/23 (!) 160/90      At goal.  Follow a low sodium (less than 2 grams of sodium per day), DASH diet.   Continue amlodipine as prescribed - refilled today.  Monitor blood pressure and report any consistent values greater than 140/90 and keep a log.  Encouraged smoking cessation to aid in BP reduction and co-morbidities.   Maintain healthy weight with a BMI goal of <30.   Aerobic exercise for 150 minutes per week (or 5 days a week for 30 minutes each day).

## 2024-01-04 NOTE — PROGRESS NOTES
"    PATIENT NAME: Adair Real  : 1960  DATE: 24  MRN: [id number]          Reason for Visit/Chief Complaint   Follow-up and Labs Only       History of Present Illness (HPI)     Adair Real is a 63 y.o. Black male presenting in clinic today for Follow-up and Labs Only. PMH: HTN, Hep C (treated wit Epclusa for 12 weeks - undetectable), HLD, restless leg syndrome, neuropathy, smoker (cigarette and marijuana). He is followed by [facility] ID clinic. Was previously followed by [facility] in [place], [place] - states he only went to 1 appt. Most recent EKG on 2022 revealed Sinus rhythm with Premature atrial complexes, Nonspecific T wave abnormality. 2021 - NM TST: Normal ETT: No significant exercise induced ST changes. PACs during exercise and rest. Max HR: 141 bpm (88% of maximum  bpm). Functional capacity: Fair. BP response: normal. HR response: normal. Stopped s/t leg pain, fatigue, and desire.      BP-118/80 - at goal. Denies CP, HA, dizziness, or vision changs. Experiences SOB at times. States he does not check BP.      PFT on 2023 reveals spirometry Data Acceptable and Reproducible. Patient struggled throughout testing with all exercises and unable to perform (Lung Volumes, and DLCO) Bronchodilator given with Albuterol and 0.5mg of Ipratropium HR 62/62 Patient tolerated testing with no ill effects noted. Moderate obstruction. No significant bronchodilator response. Vital capacity is normal. Reports using rescue inhaler a few times per week.    He reports restless legs has improved after the increase of requip at last office visit.    He reports erectile dysfunction. Unable to attain or maintain an erection. Denies urinary difficulty.    Smokes 1ppd since age 16. Smokes marijuana "a joint or 2 per day." Former alcohol drinker. Denies chest pain, cough, headache, dizziness, weakness, abdominal pain, nausea, vomiting, diarrhea, constipation, dysuria, depression, anxiety, SI, and HI.     Prostate " Cancer Screening - 8/8/2023 - PSA: 3.05. Follow up annually.   Colon Cancer Screening - FIT negative on 12/21/2020. Follow up annually. 7/19/2023-Cologuard positive - referred to [facility] End[facility] .  Lung Cancer Screening- 5/9/2023: Low Dose CT-LungRads 2   Eye Exam -Several years. List of local eye doctors given to patient.   Dental Exam - Several years. List of local dentists given to patient.   Vaccinations: Flu - 11/29/2022 / Pneumococcal - 2/2/2023 / Shingles - 3/4/2020, 7/28/2020 / Tetanus - 3/4/2020 / Covid - 6/22/2021, 7/30/2021       Review of Systems     Review of Systems   Constitutional: Negative.    HENT: Negative.     Eyes: Negative.    Respiratory: Negative.     Cardiovascular: Negative.    Gastrointestinal: Negative.    Endocrine: Negative.    Genitourinary: Negative.  Negative for decreased urine volume, difficulty urinating, frequency and urgency.        Erectile dysfunction   Musculoskeletal: Negative.    Skin: Negative.    Allergic/Immunologic: Negative.    Neurological: Negative.    Hematological: Negative.    Psychiatric/Behavioral: Negative.     All other systems reviewed and are negative.      Medical / Social / Family History     Past Medical History:   Diagnosis Date    Hyperlipidemia     Hypertension     Unspecified viral hepatitis C without hepatic coma      Past Surgical History:   Procedure Laterality Date    FRACTURE SURGERY       Social History  Adair Real's  reports that he has been smoking cigarettes. He has quit using smokeless tobacco. He reports that he does not currently use alcohol. He reports current drug use. Drug: Marijuana.    Family History  Adair Gunns family history includes No Known Problems in his brother, father, mother, and sister.    Medications and Allergies     Medications  Current Outpatient Medications   Medication Instructions    albuterol (PROVENTIL/VENTOLIN HFA) 90 mcg/actuation inhaler 1-2 puffs, Inhalation, Every 4 hours PRN    amLODIPine (NORVASC) 10  "mg, Oral, Daily    aspirin (ECOTRIN) 81 mg, Oral, Daily    atorvastatin (LIPITOR) 20 mg, Oral, Nightly    blood pressure monitor Kit 1 each, Misc.(Non-Drug; Combo Route), 2 times daily    cholecalciferol (vitamin D3) 50,000 Units, Oral, Every 7 days    medical supply, miscellaneous (BLOOD PRESSURE CUFF MISC) use as directed    omeprazole (PRILOSEC) 20 mg, Oral, Daily    rOPINIRole (REQUIP) 2 mg, Oral, Nightly    sildenafiL (VIAGRA) 25 mg, Oral, Daily PRN    tiZANidine (ZANAFLEX) 4 mg, Oral, Every 8 hours PRN    umeclidinium-vilanteroL (ANORO ELLIPTA) 62.5-25 mcg/actuation DsDv 1 puff, Inhalation, Daily       Allergies  Review of patient's allergies indicates:  No Known Allergies    Physical Examination   Visit Vitals  /80 (BP Location: Right arm, Patient Position: Sitting, BP Method: Large (Automatic))   Pulse 76   Temp 98.2 °F (36.8 °C) (Oral)   Resp 18   Ht 6' 1" (1.854 m)   Wt 72.6 kg (160 lb)   SpO2 97%   BMI 21.11 kg/m²     Physical Exam  Vitals reviewed.   Constitutional:       Appearance: Normal appearance. He is normal weight.   HENT:      Head: Normocephalic.      Nose: Nose normal.      Mouth/Throat:      Mouth: Mucous membranes are moist.      Pharynx: Oropharynx is clear.   Eyes:      Extraocular Movements: Extraocular movements intact.      Conjunctiva/sclera: Conjunctivae normal.      Pupils: Pupils are equal, round, and reactive to light.   Cardiovascular:      Rate and Rhythm: Normal rate and regular rhythm.      Heart sounds: Normal heart sounds.   Pulmonary:      Effort: Pulmonary effort is normal.      Breath sounds: Normal breath sounds.   Abdominal:      General: Abdomen is flat. Bowel sounds are normal.      Palpations: Abdomen is soft.   Musculoskeletal:         General: Normal range of motion.      Cervical back: Normal range of motion.   Skin:     General: Skin is warm and dry.      Capillary Refill: Capillary refill takes less than 2 seconds.   Neurological:      General: No focal " deficit present.      Mental Status: He is alert and oriented to person, place, and time.   Psychiatric:         Mood and Affect: Mood normal.           Results     Lab Results   Component Value Date    WBC 4.52 01/02/2024    RBC 4.84 01/02/2024    HGB 14.3 01/02/2024    HCT 45.1 01/02/2024    MCV 93.2 01/02/2024    MCH 29.5 01/02/2024    MCHC 31.7 (L) 01/02/2024    RDW 12.8 01/02/2024     01/02/2024    MPV 9.8 01/02/2024      Lab Results   Component Value Date     01/02/2024    K 3.9 01/02/2024    CHLORIDE 105 01/02/2024    CO2 29 01/02/2024    GLUCOSE 95 01/02/2024    BUN 8.4 01/02/2024    CREATININE 1.12 01/02/2024    LABPROT 7.2 01/02/2024    ALBUMIN 3.8 01/02/2024    BILITOT 0.5 01/02/2024    ALKPHOS 76 01/02/2024    AST 26 01/02/2024    ALT 21 01/02/2024    EGFRNORACEVR >60 01/02/2024     Lab Results   Component Value Date    TSH 1.839 08/08/2023     Lab Results   Component Value Date    CHOL 134 01/02/2024    HDL 44 01/02/2024    LDL 79.00 01/02/2024    TRIG 54 01/02/2024     Lab Results   Component Value Date    COLORUA Light-Yellow 01/02/2024    SGUA 1.017 01/02/2024    PROTEINUA Negative 01/02/2024    GLUCOSEUA Normal 01/02/2024    BILIRUBINUA Negative 01/02/2024    BLOODUA Negative 01/02/2024    WBCUA 0-5 01/02/2024    RBCUA 0-5 01/02/2024    BACTERIA None Seen 01/02/2024    NITRITESUA Negative 01/02/2024    LEUKOCYTESUR Negative 01/02/2024    UROBILINOGEN Normal 01/02/2024     Lab Results   Component Value Date    CREATRANDUR 139.8 01/02/2024    MICALBCREAT 12.0 01/02/2024     Lab Results   Component Value Date    [id number] 22.7 (L) 01/02/2024     Lab Results   Component Value Date    HIV Nonreactive 11/18/2022    HEPCAB Reactive 04/21/2022     Lab Results   Component Value Date    COLOGUARD Positive (A) 07/15/2023     Assessment and Plan (including Health Maintenance)     Problem List Items Addressed This Visit          Neuro    Restless legs    Current Assessment & Plan     Therapeutic  exercise (stretching, flexing and warm baths) encouraged as well decreased caffeine, alcohol and tobacco use.  Continue Requip and tizanidine as prescribed - refilled today.           Relevant Medications    tiZANidine (ZANAFLEX) 4 MG tablet    rOPINIRole (REQUIP) 2 MG tablet       Pulmonary    Chronic obstructive pulmonary disease    Overview     8/25/2023 PFT: Spirometry Data Acceptable and Reproducible. Patient struggled throughout testing with all exercises and unable to perform (Lung Volumes, and DLCO) Bronchodilator given with Albuterol and 0.5mg of Ipratropium HR 62/62 Patient tolerated testing with no ill effects noted. Moderate obstruction. No significant bronchodilator response. Vital capacity is normal.           Current Assessment & Plan     Last PFT-  8/25/2023 / Last CXR- 4/21/2022  Use inhalers as prescribed (rinse mouth after use of steroid inhalers).    Use long term inhalers daily and rescue inhaler as needed.    Avoid triggers (high humidity, strong odors, chemical fumes).    Report signs of upper respiratory infection as soon as possible for early treatment.    Practice/encouraged abdominal breathing.   Eat smaller, more frequent meals.   Flu shot recommended yearly.    Smoking cessation encouraged          Relevant Medications    umeclidinium-vilanteroL (ANORO ELLIPTA) 62.5-25 mcg/actuation DsDv       Cardiac/Vascular    Hypercholesterolemia    Current Assessment & Plan     Lab Results   Component Value Date    LDL 79.00 01/02/2024       Lab Results   Component Value Date    TRIG 54 01/02/2024       Lab Results   Component Value Date    HDL 44 01/02/2024      Lab Results   Component Value Date    CHOL 134 01/02/2024      Continue atorvastatin as prescribed - refilled today.   Follow a low cholesterol, low saturated fat diet with less than 200 mg of cholesterol a day.   Avoid fried foods and high saturated fats.  Add flax seed or fish oil supplements to diet.   Increase dietary fiber.   Regular  exercise improves cholesterol levels.  Physical activity 5 times a week for 30 minutes per day (or 150 minutes per week).   Stressed importance of dietary modifications.          Relevant Medications    atorvastatin (LIPITOR) 20 MG tablet    Other Relevant Orders    Lipid Panel    Primary hypertension - Primary    Current Assessment & Plan     BP Readings from Last 3 Encounters:   01/04/24 118/80   08/28/23 104/69   08/08/23 (!) 160/90      At goal.  Follow a low sodium (less than 2 grams of sodium per day), DASH diet.   Continue amlodipine as prescribed - refilled today.  Monitor blood pressure and report any consistent values greater than 140/90 and keep a log.  Encouraged smoking cessation to aid in BP reduction and co-morbidities.   Maintain healthy weight with a BMI goal of <30.   Aerobic exercise for 150 minutes per week (or 5 days a week for 30 minutes each day).          Relevant Medications    aspirin (ECOTRIN) 81 MG EC tablet    amLODIPine (NORVASC) 10 MG tablet    Other Relevant Orders    Urinalysis, Reflex to Urine Culture    Microalbumin/Creatinine Ratio, Urine    Comprehensive Metabolic Panel    CBC Auto Differential       Renal/    Other male erectile dysfunction    Current Assessment & Plan     Rx sildenafil - Good Rx card provided to patient.  Testosterone panel ordered.  Report to the ED immediately if you experience any of the following symptoms:  chest pain, lightheadedness, dizziness, SOB, hypotension, nausea, vomiting, or an erection that lasts longer than 4 hours.   Stop taking the medicine immediately!          Relevant Medications    sildenafiL (VIAGRA) 25 MG tablet    Other Relevant Orders    Testosterone    PSA, Screening       ID    Immunization due    Current Assessment & Plan     Written and verbal consent obtained.  Influenza vaccine was administered.  Ok to take acetaminophen for soreness of arm.          Relevant Medications    influenza (QUADRIVALENT PF) vaccine 0.5 mL (Completed)        Oncology    Positive colorectal cancer screening using Cologuard test    Current Assessment & Plan     Previously referred to endo , but was placed incorrectly.  Referral input clarified.  Referral placed to [facility] endo  for colonoscopy.         Relevant Orders    Ambulatory referral/consult to Endo Procedure        Endocrine    BMI 21.0-21.9, adult    Current Assessment & Plan     Body mass index is 21.11 kg/m². (At goal).          Relevant Orders    TSH    T4, Free    Vitamin D insufficiency    Current Assessment & Plan     Lab Results   Component Value Date    [id number] 22.7 (L) 01/02/2024   Educated on increasing foods high in Vitamin D such as fish oil, cod liver oil, salmon, milk fortified with vitamin D.  Rx vitamin D3 50,000 iu weekly.           Relevant Medications    cholecalciferol, vitamin D3, 1,250 mcg (50,000 unit) capsule    Other Relevant Orders    Vitamin D       GI    Gastroesophageal reflux disease without esophagitis    Current Assessment & Plan     Avoid spicy, acidic, fried food and alcohol.    Eat 2-3 hours before bed.   Avoid tight fitting clothes and chew food thoroughly.    Reduce caffeine intake, avoid soda.    Take omeprazole as prescribed - refilled today.  Encouraged smoking cessation.           Relevant Medications    omeprazole (PRILOSEC) 20 MG capsule       Other    Cigarette nicotine dependence, uncomplicated    Current Assessment & Plan     Dangers of cigarette smoking were reviewed with patient in detail. Patient was Counseled for 3 minutes, previously referred to smoking cessation, but has been a no show for each scheduled appt. Nicotine replacement options were discussed. Nicotine replacement was discussed-  treatment deferred to smoking cessation department.         Relevant Orders    CT Chest Lung Screening Low Dose     Other Visit Diagnoses       Screening for diabetes mellitus        Relevant Orders    Hemoglobin A1C    Screening for iron  deficiency anemia        Relevant Orders    Iron and TIBC    Ferritin             Health Maintenance Due   Topic Date Due    RSV Vaccine (Age 60+ and Pregnant patients) (1 - 1-dose 60+ series) Never done    COVID-19 Vaccine (5 - 2023-24 season) 09/01/2023     Tests to Keep You Healthy    Colon Cancer Screening: Met on 7/15/2023  Last Blood Pressure <= 139/89 (1/4/2024): Yes  Tobacco Cessation: NO      Health Maintenance Topics with due status: Not Due       Topic Last Completion Date    TETANUS VACCINE 03/04/2020    Colorectal Cancer Screening 07/15/2023    PROSTATE-SPECIFIC ANTIGEN 08/08/2023    Lipid Panel 01/02/2024    High Dose Statin 01/04/2024       Future Appointments   Date Time Provider Department Center   8/13/2024  8:00 AM Shu Coon FNP [facility]        Follow up in about 31 weeks (around 8/8/2024) for F2F, Follow up, Med check, Lab review, Wellness, RTC PRN.          Signature:        GUS Mcguire  [facility]  [address] [id number]    Date of encounter: 1/4/24

## 2024-01-04 NOTE — PATIENT INSTRUCTIONS
REMINDER: Please complete labs within 1 week of appointment.   Please complete satisfaction survey when received. Thank you.    Yusuf Borrero,     If you are due for any health screening(s) below please notify me so we can arrange them to be ordered and scheduled. Most healthy patients at your age complete them, but you are free to accept or refuse.     If you can't do it, I'll definitely understand. If you can, I'd certainly appreciate it!    All of your core healthy metrics are met.      Were here to help you quit smoking     Our records indicated that you are still smoking. One of the best things you can do for your health is to stop smoking and we are here to help.     Talk with your provider about our Smoking Cessation Program and how we can support you on your journey.

## 2024-01-04 NOTE — ASSESSMENT & PLAN NOTE
Last PFT-  8/25/2023 / Last CXR- 4/21/2022  Use inhalers as prescribed (rinse mouth after use of steroid inhalers).    Use long term inhalers daily and rescue inhaler as needed.    Avoid triggers (high humidity, strong odors, chemical fumes).    Report signs of upper respiratory infection as soon as possible for early treatment.    Practice/encouraged abdominal breathing.   Eat smaller, more frequent meals.   Flu shot recommended yearly.    Smoking cessation encouraged

## 2024-03-22 DIAGNOSIS — J44.9 CHRONIC OBSTRUCTIVE PULMONARY DISEASE, UNSPECIFIED COPD TYPE: ICD-10-CM

## 2024-03-22 RX ORDER — ALBUTEROL SULFATE 90 UG/1
1-2 AEROSOL, METERED RESPIRATORY (INHALATION) EVERY 4 HOURS PRN
Qty: 18 G | Refills: 2 | Status: SHIPPED | OUTPATIENT
Start: 2024-03-22 | End: 2025-03-22

## 2024-05-10 ENCOUNTER — TELEPHONE (OUTPATIENT)
Dept: INTERNAL MEDICINE | Facility: CLINIC | Age: 64
End: 2024-05-10
Payer: MEDICAID

## 2024-05-10 ENCOUNTER — HOSPITAL ENCOUNTER (OUTPATIENT)
Dept: RADIOLOGY | Facility: HOSPITAL | Age: 64
Discharge: HOME | End: 2024-05-10
Payer: MEDICAID

## 2024-05-10 DIAGNOSIS — Z87.891 PERSONAL HISTORY OF SMOKING: ICD-10-CM

## 2024-05-10 DIAGNOSIS — F17.210 CIGARETTE NICOTINE DEPENDENCE, UNCOMPLICATED: Primary | ICD-10-CM

## 2024-05-10 PROCEDURE — 71271 CT THORAX LUNG CANCER SCR C-: CPT | Mod: TC

## 2024-05-10 NOTE — TELEPHONE ENCOUNTER
Please inform Adair Real that there recent CT of Chest for lung cancer screening shows benign pulmonary nodules that are normally present in patients who smoke. None of these nodules are concerning at this time for any malignancy. We will repeat this exam in 1 year as recommended. Inform patient we always like to encourage smoking cessation and we can refer to smoking cessation classes if interested.    For any questions, please let me know.  Thank you,    KELVIN LoomisC

## 2024-05-13 NOTE — TELEPHONE ENCOUNTER
Called pt and informed him of his results. Pt verbalized understanding and stated he would be interested in the smoking cessation classes.

## 2024-07-01 ENCOUNTER — TELEPHONE (OUTPATIENT)
Dept: SMOKING CESSATION | Facility: CLINIC | Age: 64
End: 2024-07-01
Payer: MEDICAID

## 2024-07-01 NOTE — TELEPHONE ENCOUNTER
Pt had not shown up for his [facility] appointment.  Called pt.  No answer.  Phone just rang.  Unable to leave a voice message.

## 2024-07-01 NOTE — TELEPHONE ENCOUNTER
Pt called back.  Pt stated that he was not able to come in today due to transportation issues.  Pt rides a bicycle.  Pt requested to have appointment closer to his home.  Pt would be closer to [facility] location.  Will have them schedule an appointment for him at that location.

## 2024-08-07 ENCOUNTER — LAB VISIT (OUTPATIENT)
Dept: LAB | Facility: HOSPITAL | Age: 64
End: 2024-08-07
Payer: MEDICAID

## 2024-08-07 DIAGNOSIS — I10 PRIMARY HYPERTENSION: ICD-10-CM

## 2024-08-07 DIAGNOSIS — Z13.0 SCREENING FOR IRON DEFICIENCY ANEMIA: ICD-10-CM

## 2024-08-07 DIAGNOSIS — N52.8 OTHER MALE ERECTILE DYSFUNCTION: ICD-10-CM

## 2024-08-07 DIAGNOSIS — E78.00 HYPERCHOLESTEROLEMIA: ICD-10-CM

## 2024-08-07 DIAGNOSIS — J44.9 CHRONIC OBSTRUCTIVE PULMONARY DISEASE, UNSPECIFIED COPD TYPE: ICD-10-CM

## 2024-08-07 DIAGNOSIS — G25.81 RESTLESS LEGS: ICD-10-CM

## 2024-08-07 DIAGNOSIS — E55.9 VITAMIN D INSUFFICIENCY: ICD-10-CM

## 2024-08-07 DIAGNOSIS — Z13.1 SCREENING FOR DIABETES MELLITUS: ICD-10-CM

## 2024-08-07 DIAGNOSIS — K21.9 GASTROESOPHAGEAL REFLUX DISEASE WITHOUT ESOPHAGITIS: ICD-10-CM

## 2024-08-07 LAB
25(OH)D3+25(OH)D2 SERPL-MCNC: 43 NG/ML (ref 30–80)
ALBUMIN SERPL-MCNC: 3.8 G/DL (ref 3.4–4.8)
ALBUMIN/GLOB SERPL: 1.1 RATIO (ref 1.1–2)
ALP SERPL-CCNC: 73 UNIT/L (ref 40–150)
ALT SERPL-CCNC: 21 UNIT/L (ref 0–55)
ANION GAP SERPL CALC-SCNC: 8 MEQ/L
AST SERPL-CCNC: 26 UNIT/L (ref 5–34)
BASOPHILS # BLD AUTO: 0.05 X10(3)/MCL
BASOPHILS NFR BLD AUTO: 0.8 %
BILIRUB SERPL-MCNC: 0.5 MG/DL
BUN SERPL-MCNC: 12.7 MG/DL (ref 8.4–25.7)
CALCIUM SERPL-MCNC: 9.9 MG/DL (ref 8.8–10)
CHLORIDE SERPL-SCNC: 104 MMOL/L (ref 98–107)
CHOLEST SERPL-MCNC: 179 MG/DL
CHOLEST/HDLC SERPL: 4 {RATIO} (ref 0–5)
CO2 SERPL-SCNC: 29 MMOL/L (ref 23–31)
CREAT SERPL-MCNC: 1.14 MG/DL (ref 0.73–1.18)
CREAT/UREA NIT SERPL: 11
EOSINOPHIL # BLD AUTO: 0.21 X10(3)/MCL (ref 0–0.9)
EOSINOPHIL NFR BLD AUTO: 3.3 %
ERYTHROCYTE [DISTWIDTH] IN BLOOD BY AUTOMATED COUNT: 13 % (ref 11.5–17)
EST. AVERAGE GLUCOSE BLD GHB EST-MCNC: 119.8 MG/DL
FERRITIN SERPL-MCNC: 163.81 NG/ML (ref 21.81–274.66)
GFR SERPLBLD CREATININE-BSD FMLA CKD-EPI: >60 ML/MIN/1.73/M2
GLOBULIN SER-MCNC: 3.6 GM/DL (ref 2.4–3.5)
GLUCOSE SERPL-MCNC: 98 MG/DL (ref 82–115)
HBA1C MFR BLD: 5.8 %
HCT VFR BLD AUTO: 43.4 % (ref 42–52)
HDLC SERPL-MCNC: 46 MG/DL (ref 35–60)
HGB BLD-MCNC: 14.4 G/DL (ref 14–18)
IMM GRANULOCYTES # BLD AUTO: 0.01 X10(3)/MCL (ref 0–0.04)
IMM GRANULOCYTES NFR BLD AUTO: 0.2 %
IRON SATN MFR SERPL: 40 % (ref 20–50)
IRON SERPL-MCNC: 117 UG/DL (ref 65–175)
LDLC SERPL CALC-MCNC: 115 MG/DL (ref 50–140)
LYMPHOCYTES # BLD AUTO: 1.99 X10(3)/MCL (ref 0.6–4.6)
LYMPHOCYTES NFR BLD AUTO: 30.8 %
MCH RBC QN AUTO: 30.8 PG (ref 27–31)
MCHC RBC AUTO-ENTMCNC: 33.2 G/DL (ref 33–36)
MCV RBC AUTO: 92.7 FL (ref 80–94)
MONOCYTES # BLD AUTO: 0.55 X10(3)/MCL (ref 0.1–1.3)
MONOCYTES NFR BLD AUTO: 8.5 %
NEUTROPHILS # BLD AUTO: 3.65 X10(3)/MCL (ref 2.1–9.2)
NEUTROPHILS NFR BLD AUTO: 56.4 %
NRBC BLD AUTO-RTO: 0 %
PLATELET # BLD AUTO: 213 X10(3)/MCL (ref 130–400)
PMV BLD AUTO: 10.2 FL (ref 7.4–10.4)
POTASSIUM SERPL-SCNC: 4 MMOL/L (ref 3.5–5.1)
PROT SERPL-MCNC: 7.4 GM/DL (ref 5.8–7.6)
PSA SERPL-MCNC: 2.71 NG/ML
RBC # BLD AUTO: 4.68 X10(6)/MCL (ref 4.7–6.1)
SODIUM SERPL-SCNC: 141 MMOL/L (ref 136–145)
T4 FREE SERPL-MCNC: 0.97 NG/DL (ref 0.7–1.48)
TESTOST SERPL-MCNC: 701.04 NG/DL (ref 220.91–715.81)
TIBC SERPL-MCNC: 175 UG/DL (ref 69–240)
TIBC SERPL-MCNC: 292 UG/DL (ref 250–450)
TRANSFERRIN SERPL-MCNC: 263 MG/DL (ref 163–344)
TRIGL SERPL-MCNC: 89 MG/DL (ref 34–140)
TSH SERPL-ACNC: 1.91 UIU/ML (ref 0.35–4.94)
VLDLC SERPL CALC-MCNC: 18 MG/DL
WBC # BLD AUTO: 6.46 X10(3)/MCL (ref 4.5–11.5)

## 2024-08-07 PROCEDURE — 84403 ASSAY OF TOTAL TESTOSTERONE: CPT

## 2024-08-07 PROCEDURE — 80053 COMPREHEN METABOLIC PANEL: CPT

## 2024-08-07 PROCEDURE — 84153 ASSAY OF PSA TOTAL: CPT

## 2024-08-07 PROCEDURE — 84443 ASSAY THYROID STIM HORMONE: CPT

## 2024-08-07 PROCEDURE — 84439 ASSAY OF FREE THYROXINE: CPT

## 2024-08-07 PROCEDURE — 82728 ASSAY OF FERRITIN: CPT

## 2024-08-07 PROCEDURE — 83036 HEMOGLOBIN GLYCOSYLATED A1C: CPT

## 2024-08-07 PROCEDURE — 80061 LIPID PANEL: CPT

## 2024-08-07 PROCEDURE — 36415 COLL VENOUS BLD VENIPUNCTURE: CPT

## 2024-08-07 PROCEDURE — 82306 VITAMIN D 25 HYDROXY: CPT

## 2024-08-07 PROCEDURE — 83540 ASSAY OF IRON: CPT

## 2024-08-07 PROCEDURE — 85025 COMPLETE CBC W/AUTO DIFF WBC: CPT

## 2024-08-08 ENCOUNTER — CLINICAL SUPPORT (OUTPATIENT)
Dept: SMOKING CESSATION | Facility: CLINIC | Age: 64
End: 2024-08-08

## 2024-08-08 DIAGNOSIS — F17.200 NICOTINE DEPENDENCE: Primary | ICD-10-CM

## 2024-08-08 RX ORDER — IBUPROFEN 200 MG
1 TABLET ORAL DAILY
Qty: 28 PATCH | Refills: 0 | Status: SHIPPED | OUTPATIENT
Start: 2024-08-08

## 2024-08-09 RX ORDER — ROPINIROLE 2 MG/1
2 TABLET, FILM COATED ORAL NIGHTLY
Qty: 90 TABLET | Refills: 2 | Status: SHIPPED | OUTPATIENT
Start: 2024-08-09 | End: 2025-08-09

## 2024-08-09 RX ORDER — SILDENAFIL 25 MG/1
25 TABLET, FILM COATED ORAL DAILY PRN
Qty: 30 TABLET | Refills: 2 | Status: SHIPPED | OUTPATIENT
Start: 2024-08-09 | End: 2025-08-09

## 2024-08-09 RX ORDER — OMEPRAZOLE 20 MG/1
20 CAPSULE, DELAYED RELEASE ORAL DAILY
Qty: 90 CAPSULE | Refills: 2 | Status: SHIPPED | OUTPATIENT
Start: 2024-08-09 | End: 2025-08-09

## 2024-08-09 RX ORDER — ATORVASTATIN CALCIUM 20 MG/1
20 TABLET, FILM COATED ORAL NIGHTLY
Qty: 90 TABLET | Refills: 3 | Status: SHIPPED | OUTPATIENT
Start: 2024-08-09 | End: 2025-08-09

## 2024-08-09 RX ORDER — UMECLIDINIUM BROMIDE AND VILANTEROL TRIFENATATE 62.5; 25 UG/1; UG/1
1 POWDER RESPIRATORY (INHALATION) DAILY
Qty: 60 EACH | Refills: 2 | Status: SHIPPED | OUTPATIENT
Start: 2024-08-09 | End: 2025-08-09

## 2024-08-09 RX ORDER — ALBUTEROL SULFATE 90 UG/1
1-2 INHALANT RESPIRATORY (INHALATION) EVERY 4 HOURS PRN
Qty: 18 G | Refills: 2 | Status: SHIPPED | OUTPATIENT
Start: 2024-08-09 | End: 2025-08-09

## 2024-08-09 RX ORDER — TIZANIDINE 4 MG/1
4 TABLET ORAL EVERY 8 HOURS PRN
Qty: 60 TABLET | Refills: 2 | Status: SHIPPED | OUTPATIENT
Start: 2024-08-09 | End: 2025-08-09

## 2024-08-09 RX ORDER — ASPIRIN 81 MG/1
81 TABLET ORAL DAILY
Qty: 90 TABLET | Refills: 2 | Status: SHIPPED | OUTPATIENT
Start: 2024-08-09 | End: 2025-08-09

## 2024-08-09 RX ORDER — ACETAMINOPHEN 500 MG
1 TABLET ORAL 2 TIMES DAILY
Qty: 1 EACH | Refills: 0 | OUTPATIENT
Start: 2024-08-09

## 2024-08-09 RX ORDER — AMLODIPINE BESYLATE 10 MG/1
10 TABLET ORAL DAILY
Qty: 90 TABLET | Refills: 2 | Status: SHIPPED | OUTPATIENT
Start: 2024-08-09 | End: 2025-08-09

## 2024-09-04 ENCOUNTER — OFFICE VISIT (OUTPATIENT)
Dept: INTERNAL MEDICINE | Facility: CLINIC | Age: 64
End: 2024-09-04
Payer: MEDICAID

## 2024-09-04 VITALS
RESPIRATION RATE: 18 BRPM | HEIGHT: 73 IN | TEMPERATURE: 98 F | HEART RATE: 86 BPM | OXYGEN SATURATION: 97 % | DIASTOLIC BLOOD PRESSURE: 76 MMHG | SYSTOLIC BLOOD PRESSURE: 124 MMHG | BODY MASS INDEX: 19.76 KG/M2 | WEIGHT: 149.13 LBS

## 2024-09-04 DIAGNOSIS — E78.00 HYPERCHOLESTEROLEMIA: ICD-10-CM

## 2024-09-04 DIAGNOSIS — G25.81 RESTLESS LEGS: ICD-10-CM

## 2024-09-04 DIAGNOSIS — K21.9 GASTROESOPHAGEAL REFLUX DISEASE WITHOUT ESOPHAGITIS: ICD-10-CM

## 2024-09-04 DIAGNOSIS — I10 PRIMARY HYPERTENSION: ICD-10-CM

## 2024-09-04 DIAGNOSIS — F17.210 CIGARETTE NICOTINE DEPENDENCE, UNCOMPLICATED: ICD-10-CM

## 2024-09-04 DIAGNOSIS — N52.8 OTHER MALE ERECTILE DYSFUNCTION: ICD-10-CM

## 2024-09-04 DIAGNOSIS — Z00.00 WELL ADULT EXAM: Primary | ICD-10-CM

## 2024-09-04 DIAGNOSIS — R73.03 PREDIABETES: ICD-10-CM

## 2024-09-04 DIAGNOSIS — J44.9 CHRONIC OBSTRUCTIVE PULMONARY DISEASE, UNSPECIFIED COPD TYPE: ICD-10-CM

## 2024-09-04 PROCEDURE — 3078F DIAST BP <80 MM HG: CPT | Mod: CPTII,,,

## 2024-09-04 PROCEDURE — 99214 OFFICE O/P EST MOD 30 MIN: CPT | Mod: PBBFAC

## 2024-09-04 PROCEDURE — 99214 OFFICE O/P EST MOD 30 MIN: CPT | Mod: S$PBB,25,,

## 2024-09-04 PROCEDURE — 3044F HG A1C LEVEL LT 7.0%: CPT | Mod: CPTII,,,

## 2024-09-04 PROCEDURE — 3008F BODY MASS INDEX DOCD: CPT | Mod: CPTII,,,

## 2024-09-04 PROCEDURE — 3074F SYST BP LT 130 MM HG: CPT | Mod: CPTII,,,

## 2024-09-04 PROCEDURE — 99396 PREV VISIT EST AGE 40-64: CPT | Mod: S$PBB,,,

## 2024-09-04 PROCEDURE — 3061F NEG MICROALBUMINURIA REV: CPT | Mod: CPTII,,,

## 2024-09-04 PROCEDURE — 1159F MED LIST DOCD IN RCRD: CPT | Mod: CPTII,,,

## 2024-09-04 PROCEDURE — 1160F RVW MEDS BY RX/DR IN RCRD: CPT | Mod: CPTII,,,

## 2024-09-04 PROCEDURE — 3066F NEPHROPATHY DOC TX: CPT | Mod: CPTII,,,

## 2024-09-04 RX ORDER — ATORVASTATIN CALCIUM 20 MG/1
20 TABLET, FILM COATED ORAL NIGHTLY
Qty: 90 TABLET | Refills: 3 | Status: SHIPPED | OUTPATIENT
Start: 2024-09-04 | End: 2025-09-04

## 2024-09-04 RX ORDER — ROPINIROLE 2 MG/1
2 TABLET, FILM COATED ORAL NIGHTLY
Qty: 90 TABLET | Refills: 2 | Status: SHIPPED | OUTPATIENT
Start: 2024-09-04 | End: 2025-09-04

## 2024-09-04 RX ORDER — TIZANIDINE 4 MG/1
8 TABLET ORAL EVERY 8 HOURS PRN
Qty: 90 TABLET | Refills: 2 | Status: SHIPPED | OUTPATIENT
Start: 2024-09-04 | End: 2025-09-04

## 2024-09-04 RX ORDER — AMLODIPINE BESYLATE 10 MG/1
10 TABLET ORAL DAILY
Qty: 90 TABLET | Refills: 2 | Status: SHIPPED | OUTPATIENT
Start: 2024-09-04 | End: 2025-09-04

## 2024-09-04 RX ORDER — ALBUTEROL SULFATE 90 UG/1
1-2 INHALANT RESPIRATORY (INHALATION) EVERY 4 HOURS PRN
Qty: 18 G | Refills: 2 | Status: SHIPPED | OUTPATIENT
Start: 2024-09-04 | End: 2025-09-04

## 2024-09-04 RX ORDER — OMEPRAZOLE 20 MG/1
20 CAPSULE, DELAYED RELEASE ORAL DAILY
Qty: 90 CAPSULE | Refills: 2 | Status: SHIPPED | OUTPATIENT
Start: 2024-09-04 | End: 2025-09-04

## 2024-09-04 RX ORDER — UMECLIDINIUM BROMIDE AND VILANTEROL TRIFENATATE 62.5; 25 UG/1; UG/1
1 POWDER RESPIRATORY (INHALATION) DAILY
Qty: 60 EACH | Refills: 2 | Status: SHIPPED | OUTPATIENT
Start: 2024-09-04 | End: 2025-09-04

## 2024-09-04 RX ORDER — ASPIRIN 81 MG/1
81 TABLET ORAL DAILY
Qty: 90 TABLET | Refills: 2 | Status: SHIPPED | OUTPATIENT
Start: 2024-09-04 | End: 2025-09-04

## 2024-09-04 RX ORDER — SILDENAFIL 25 MG/1
25 TABLET, FILM COATED ORAL DAILY PRN
Qty: 30 TABLET | Refills: 2 | Status: SHIPPED | OUTPATIENT
Start: 2024-09-04 | End: 2025-09-04

## 2024-09-04 NOTE — PATIENT INSTRUCTIONS
REMINDER: Please complete labs within 1 week of appointment.   Please complete satisfaction survey when received. Thank you.    Yusuf Borrero,     If you are due for any health screening(s) below please notify me so we can arrange them to be ordered and scheduled. Most healthy patients at your age complete them, but you are free to accept or refuse.     If you can't do it, I'll definitely understand. If you can, I'd certainly appreciate it!    Tests to Keep You Healthy    Colon Cancer Screening: Met on 7/15/2023  Last Blood Pressure <= 139/89 (9/4/2024): Yes  Tobacco Cessation: NO      Were here to help you quit smoking     Our records indicated that you are still smoking. One of the best things you can do for your health is to stop smoking and we are here to help.     Talk with your provider about our Smoking Cessation Program and how we can support you on your journey.

## 2024-09-04 NOTE — PROGRESS NOTES
"    PATIENT NAME: Adair Real  : 1960  DATE: 24  MRN: [id number]          Reason for Visit/Chief Complaint   Hypertension, Hyperlipidemia, Annual Exam, and Results       History of Present Illness (HPI)     Adair Real is a 63 y.o. Black male presenting in clinic today for Hypertension, Hyperlipidemia, Annual Exam, and Results. PMH: HTN, Hep C (treated wit Epclusa for 12 weeks - undetectable), HLD, restless leg syndrome, neuropathy, smoker (cigarette and marijuana). He is followed by [facility] clinic. Was previously followed by [facility] in [place], [place] - states he only went to 1 appt. Most recent EKG on 2022 revealed Sinus rhythm with Premature atrial complexes, Nonspecific T wave abnormality. 2021 - NM TST: Normal ETT: No significant exercise induced ST changes. PACs during exercise and rest. Max HR: 141 bpm (88% of maximum  bpm). Functional capacity: Fair. BP response: normal. HR response: normal. Stopped s/t leg pain, fatigue, and desire.     All pertinent labs dated 2024 reviewed and discussed with patient. Denies any acute complaints.     BP-124/80 - at goal. Denies CP, HA, dizziness, or vision changs. Experiences SOB at times. States he does not check BP.   Smokes 1ppd since age 16. Smokes marijuana "a joint or 2 per day." Former alcohol drinker. Denies chest pain, cough, headache, dizziness, weakness, abdominal pain, nausea, vomiting, diarrhea, constipation, dysuria, depression, anxiety, SI, and HI.     Prostate Cancer Screening - 2024 - PSA: 2.71. Follow up annually.   Colon Cancer Screening - FIT negative on 2020. Follow up annually. 2023-Cologuard positive - referred to [facility] .  Lung Cancer Screening- 5/10/2024: Low Dose CT-LungRads 2   Eye Exam -Several years. List of local eye doctors given to patient.   Dental Exam - Several years. List of local dentists given to patient.   Vaccinations: Flu - Not currently being offered, recommended annually. / " Pneumococcal - 2/2/2023 / Shingles - 3/4/2020, 7/28/2020 / Tetanus - 3/4/2020 / Covid - 6/22/2021, 7/30/2021       Review of Systems     Review of Systems   Constitutional: Negative.    HENT: Negative.     Eyes: Negative.    Respiratory: Negative.     Cardiovascular: Negative.    Gastrointestinal: Negative.    Endocrine: Negative.    Genitourinary: Negative.  Negative for decreased urine volume, difficulty urinating, frequency and urgency.        Erectile dysfunction   Musculoskeletal: Negative.    Skin: Negative.    Allergic/Immunologic: Negative.    Neurological: Negative.    Hematological: Negative.    Psychiatric/Behavioral: Negative.     All other systems reviewed and are negative.      Medical / Social / Family History     Past Medical History:   Diagnosis Date    COPD (chronic obstructive pulmonary disease)     Hyperlipidemia     Hypertension     Unspecified viral hepatitis C without hepatic coma      Past Surgical History:   Procedure Laterality Date    FRACTURE SURGERY       Social History  Adair Leone  reports that he has been smoking cigarettes. He started smoking about 48 years ago. He has a 36.7 pack-year smoking history. He has been exposed to tobacco smoke. He has never used smokeless tobacco. He reports that he does not currently use alcohol. He reports current drug use. Drug: Marijuana.    Family History  Adair Leone family history includes No Known Problems in his brother, father, mother, and sister.    Medications and Allergies     Medications  Current Outpatient Medications   Medication Instructions    albuterol (PROVENTIL/VENTOLIN HFA) 90 mcg/actuation inhaler 1-2 puffs, Inhalation, Every 4 hours PRN    amLODIPine (NORVASC) 10 mg, Oral, Daily    aspirin (ECOTRIN) 81 mg, Oral, Daily    atorvastatin (LIPITOR) 20 mg, Oral, Nightly    blood pressure monitor Kit 1 each, Misc.(Non-Drug; Combo Route), 2 times daily    medical supply, miscellaneous (BLOOD PRESSURE CUFF MISC) use as directed     "nicotine (NICODERM CQ) 21 mg/24 hr 1 patch, Transdermal, Daily    omeprazole (PRILOSEC) 20 mg, Oral, Daily    rOPINIRole (REQUIP) 2 mg, Oral, Nightly    sildenafiL (VIAGRA) 25 mg, Oral, Daily PRN    tiZANidine (ZANAFLEX) 8 mg, Oral, Every 8 hours PRN    umeclidinium-vilanteroL (ANORO ELLIPTA) 62.5-25 mcg/actuation DsDv 1 puff, Inhalation, Daily       Allergies  Review of patient's allergies indicates:  No Known Allergies    Physical Examination   Visit Vitals  /76 (BP Location: Left arm, Patient Position: Sitting, BP Method: Small (Automatic))   Pulse 86   Temp 98.1 °F (36.7 °C) (Oral)   Resp 18   Ht 6' 1" (1.854 m)   Wt 67.6 kg (149 lb 1.6 oz)   SpO2 97%   BMI 19.67 kg/m²     Physical Exam  Vitals reviewed.   Constitutional:       Appearance: Normal appearance. He is normal weight.   HENT:      Head: Normocephalic.      Nose: Nose normal.      Mouth/Throat:      Mouth: Mucous membranes are moist.      Pharynx: Oropharynx is clear.   Eyes:      Extraocular Movements: Extraocular movements intact.      Conjunctiva/sclera: Conjunctivae normal.      Pupils: Pupils are equal, round, and reactive to light.   Cardiovascular:      Rate and Rhythm: Normal rate and regular rhythm.      Heart sounds: Normal heart sounds.   Pulmonary:      Effort: Pulmonary effort is normal.      Breath sounds: Normal breath sounds.   Abdominal:      General: Abdomen is flat. Bowel sounds are normal.      Palpations: Abdomen is soft.   Musculoskeletal:         General: Normal range of motion.      Cervical back: Normal range of motion.   Skin:     General: Skin is warm and dry.      Capillary Refill: Capillary refill takes less than 2 seconds.   Neurological:      General: No focal deficit present.      Mental Status: He is alert and oriented to person, place, and time.   Psychiatric:         Mood and Affect: Mood normal.           Results     Lab Results   Component Value Date    WBC 6.46 08/07/2024    RBC 4.68 (L) 08/07/2024    HGB " 14.4 08/07/2024    HCT 43.4 08/07/2024    MCV 92.7 08/07/2024    MCH 30.8 08/07/2024    MCHC 33.2 08/07/2024    RDW 13.0 08/07/2024     08/07/2024    MPV 10.2 08/07/2024      Lab Results   Component Value Date     08/07/2024    K 4.0 08/07/2024    CO2 29 08/07/2024    GLUCOSE 98 08/07/2024    BUN 12.7 08/07/2024    CREATININE 1.14 08/07/2024    LABPROT 7.4 08/07/2024    ALBUMIN 3.8 08/07/2024    BILITOT 0.5 08/07/2024    ALKPHOS 73 08/07/2024    AST 26 08/07/2024    ALT 21 08/07/2024    AGAP 8.0 08/07/2024    EGFRNORACEVR >60 08/07/2024     Lab Results   Component Value Date    TSH 1.908 08/07/2024     Lab Results   Component Value Date    CHOL 179 08/07/2024    HDL 46 08/07/2024    .00 08/07/2024    TRIG 89 08/07/2024     Lab Results   Component Value Date    SGUA 1.022 08/07/2024    PROTEINUA Trace (A) 08/07/2024    BILIRUBINUA Negative 08/07/2024    WBCUA 0-5 08/07/2024    RBCUA 0-5 08/07/2024    BACTERIA None Seen 08/07/2024    LEUKOCYTESUR 25 (A) 08/07/2024    UROBILINOGEN Normal 08/07/2024     Lab Results   Component Value Date    CREATRANDUR 217.9 (H) 08/07/2024    MICALBCREAT 9.9 08/07/2024     Lab Results   Component Value Date    UNWIEOYW84XU 43 08/07/2024     Lab Results   Component Value Date    HIV Nonreactive 11/18/2022    HEPBSAG Nonreactive 11/18/2022    HEPCAB Reactive 04/21/2022     Lab Results   Component Value Date    COLOGUARD Positive (A) 07/15/2023     Assessment and Plan (including Health Maintenance)     Problem List Items Addressed This Visit          Neuro    Restless legs    Current Assessment & Plan     Therapeutic exercise (stretching, flexing and warm baths) encouraged as well decreased caffeine, alcohol and tobacco use.  Continue Requip as prescribed - refilled today.  Increase tizanidine.           Relevant Medications    rOPINIRole (REQUIP) 2 MG tablet    tiZANidine (ZANAFLEX) 4 MG tablet       Pulmonary    Chronic obstructive pulmonary disease    Overview      8/25/2023 PFT: Spirometry Data Acceptable and Reproducible. Patient struggled throughout testing with all exercises and unable to perform (Lung Volumes, and DLCO) Bronchodilator given with Albuterol and 0.5mg of Ipratropium HR 62/62 Patient tolerated testing with no ill effects noted. Moderate obstruction. No significant bronchodilator response. Vital capacity is normal.           Relevant Medications    albuterol (PROVENTIL/VENTOLIN HFA) 90 mcg/actuation inhaler    umeclidinium-vilanteroL (ANORO ELLIPTA) 62.5-25 mcg/actuation DsDv       Cardiac/Vascular    Hypercholesterolemia    Current Assessment & Plan     Lab Results   Component Value Date    .00 08/07/2024       Lab Results   Component Value Date    TRIG 89 08/07/2024       Lab Results   Component Value Date    HDL 46 08/07/2024        Lab Results   Component Value Date    CHOL 179 08/07/2024      Continue atorvastatin as prescribed - refilled today.   Follow a low cholesterol, low saturated fat diet with less than 200 mg of cholesterol a day.   Avoid fried foods and high saturated fats.  Add flax seed or fish oil supplements to diet.   Increase dietary fiber.   Regular exercise improves cholesterol levels.  Physical activity 5 times a week for 30 minutes per day (or 150 minutes per week).   Stressed importance of dietary modifications.          Relevant Medications    atorvastatin (LIPITOR) 20 MG tablet    Other Relevant Orders    Lipid Panel    Primary hypertension    Current Assessment & Plan     BP Readings from Last 3 Encounters:   09/04/24 124/76   01/04/24 118/80   08/28/23 104/69      At goal.  Follow a low sodium (less than 2 grams of sodium per day), DASH diet.   Continue amlodipine as prescribed - refilled today.  Monitor blood pressure and report any consistent values greater than 140/90 and keep a log.  Encouraged smoking cessation to aid in BP reduction and co-morbidities.   Maintain healthy weight with a BMI goal of <30.   Aerobic  exercise for 150 minutes per week (or 5 days a week for 30 minutes each day).          Relevant Medications    amLODIPine (NORVASC) 10 MG tablet    aspirin (ECOTRIN) 81 MG EC tablet    Other Relevant Orders    CBC Auto Differential    Comprehensive Metabolic Panel    Urinalysis, Reflex to Urine Culture       Renal/    Other male erectile dysfunction    Current Assessment & Plan      Latest Reference Range & Units 08/07/24 07:59   Prostate Specific Antigen <=4.00 ng/mL 2.71   Testosterone Total 220.91 - 715.81 ng/dL 701.04     Continue sildenafil - refilled today.  Report to the ED immediately if you experience any of the following symptoms:  chest pain, lightheadedness, dizziness, SOB, hypotension, nausea, vomiting, or an erection that lasts longer than 4 hours.   Stop taking the medicine immediately!          Relevant Medications    sildenafiL (VIAGRA) 25 MG tablet       Endocrine    Prediabetes    Current Assessment & Plan     Lab Results   Component Value Date    HGBA1C 5.8 08/07/2024     Medication initiation if HgbA1C becomes >6.5.  Avoid soda, simple sweets, and limit rice/pasta/bread/starches and consume brown options when possible.    Maintain healthy weight with BMI goal <30.    Perform aerobic exercise for 150 minutes per week (or 5 days a week for 30 minutes each day).           Relevant Orders    Hemoglobin A1C       GI    Gastroesophageal reflux disease without esophagitis    Current Assessment & Plan     Avoid spicy, acidic, fried food and alcohol.    Eat 2-3 hours before bed.   Avoid tight fitting clothes and chew food thoroughly.    Reduce caffeine intake, avoid soda.    Take omeprazole as prescribed - refilled today.  Encouraged smoking cessation.           Relevant Medications    omeprazole (PRILOSEC) 20 MG capsule       Other    Cigarette nicotine dependence, uncomplicated    Current Assessment & Plan     Smoking cessation discussed for 3 minutes.   Pt not ready to quit.  Declines referral to  Smoking Cessation program.  Discussed benefits of quitting including improved health, decreased cardiac/vascular/pulmonary/stroke risks as well as saving money.          Well adult exam - Primary    Current Assessment & Plan     Wellness labs - 8/7/2024.   Prostate Cancer Screening - 8/7/2024 - PSA: 2.71. Follow up annually.   Colon Cancer Screening - FIT negative on 12/21/2020. Follow up annually. 7/19/2023-Cologuard positive - referred to [facility] .  Lung Cancer Screening- 5/10/2024: Low Dose CT-LungRads 2   Eye Exam -Several years. List of local eye doctors given to patient.   Dental Exam - Several years. List of local dentists given to patient.   Vaccinations: Flu - Not currently being offered, recommended annually. / Pneumococcal - 2/2/2023 / Shingles - 3/4/2020, 7/28/2020 / Tetanus - 3/4/2020 / Covid - 6/22/2021, 7/30/2021         Body mass index (BMI) 19.9 or less, adult    Current Assessment & Plan     Body mass index is 19.67 kg/m².                Health Maintenance Due   Topic Date Due    RSV Vaccine (Age 60+ and Pregnant patients) (1 - 1-dose 60+ series) Never done    Influenza Vaccine (1) 09/01/2024    COVID-19 Vaccine (5 - 2023-24 season) 09/01/2024     Tests to Keep You Healthy    Colon Cancer Screening: Met on 7/15/2023  Last Blood Pressure <= 139/89 (9/4/2024): Yes  Tobacco Cessation: NO      Health Maintenance Topics with due status: Not Due       Topic Last Completion Date    TETANUS VACCINE 03/04/2020    Colorectal Cancer Screening 07/15/2023    LDCT Lung Screen 05/10/2024    PROSTATE-SPECIFIC ANTIGEN 08/07/2024    Lipid Panel 08/07/2024    High Dose Statin 09/04/2024       Future Appointments   Date Time Provider Department Center   9/5/2024  3:00 PM Tyra Real [facility]   3/5/2025  1:20 PM Shu Coon FNP [facility]        Follow up in about 6 months (around 3/4/2025) for F2F, Follow up, Med check, Lab review, RTC PRN.          Signature:         GUS Mcguire  [facility]  [address] [facility] [id number]    Date of encounter: 9/4/24

## 2024-09-05 ENCOUNTER — TELEPHONE (OUTPATIENT)
Dept: SMOKING CESSATION | Facility: CLINIC | Age: 64
End: 2024-09-05
Payer: MEDICAID

## 2024-09-05 PROBLEM — R73.03 PREDIABETES: Status: ACTIVE | Noted: 2024-09-05

## 2024-09-05 NOTE — ASSESSMENT & PLAN NOTE
Lab Results   Component Value Date    HGBA1C 5.8 08/07/2024     Medication initiation if HgbA1C becomes >6.5.  Avoid soda, simple sweets, and limit rice/pasta/bread/starches and consume brown options when possible.    Maintain healthy weight with BMI goal <30.    Perform aerobic exercise for 150 minutes per week (or 5 days a week for 30 minutes each day).

## 2024-09-05 NOTE — ASSESSMENT & PLAN NOTE
BP Readings from Last 3 Encounters:   09/04/24 124/76   01/04/24 118/80   08/28/23 104/69      At goal.  Follow a low sodium (less than 2 grams of sodium per day), DASH diet.   Continue amlodipine as prescribed - refilled today.  Monitor blood pressure and report any consistent values greater than 140/90 and keep a log.  Encouraged smoking cessation to aid in BP reduction and co-morbidities.   Maintain healthy weight with a BMI goal of <30.   Aerobic exercise for 150 minutes per week (or 5 days a week for 30 minutes each day).

## 2024-09-05 NOTE — ASSESSMENT & PLAN NOTE
Wellness labs - 8/7/2024.   Prostate Cancer Screening - 8/7/2024 - PSA: 2.71. Follow up annually.   Colon Cancer Screening - FIT negative on 12/21/2020. Follow up annually. 7/19/2023-Cologuard positive - referred to [facility] .  Lung Cancer Screening- 5/10/2024: Low Dose CT-LungRads 2   Eye Exam -Several years. List of local eye doctors given to patient.   Dental Exam - Several years. List of local dentists given to patient.   Vaccinations: Flu - Not currently being offered, recommended annually. / Pneumococcal - 2/2/2023 / Shingles - 3/4/2020, 7/28/2020 / Tetanus - 3/4/2020 / Covid - 6/22/2021, 7/30/2021

## 2024-09-05 NOTE — ASSESSMENT & PLAN NOTE
Lab Results   Component Value Date    .00 08/07/2024       Lab Results   Component Value Date    TRIG 89 08/07/2024       Lab Results   Component Value Date    HDL 46 08/07/2024        Lab Results   Component Value Date    CHOL 179 08/07/2024      Continue atorvastatin as prescribed - refilled today.   Follow a low cholesterol, low saturated fat diet with less than 200 mg of cholesterol a day.   Avoid fried foods and high saturated fats.  Add flax seed or fish oil supplements to diet.   Increase dietary fiber.   Regular exercise improves cholesterol levels.  Physical activity 5 times a week for 30 minutes per day (or 150 minutes per week).   Stressed importance of dietary modifications.

## 2024-09-05 NOTE — ASSESSMENT & PLAN NOTE
Therapeutic exercise (stretching, flexing and warm baths) encouraged as well decreased caffeine, alcohol and tobacco use.  Continue Requip as prescribed - refilled today.  Increase tizanidine.

## 2024-09-05 NOTE — ASSESSMENT & PLAN NOTE
Latest Reference Range & Units 08/07/24 07:59   Prostate Specific Antigen <=4.00 ng/mL 2.71   Testosterone Total 220.91 - 715.81 ng/dL 701.04     Continue sildenafil - refilled today.  Report to the ED immediately if you experience any of the following symptoms:  chest pain, lightheadedness, dizziness, SOB, hypotension, nausea, vomiting, or an erection that lasts longer than 4 hours.   Stop taking the medicine immediately!

## 2024-10-25 ENCOUNTER — HOSPITAL ENCOUNTER (EMERGENCY)
Facility: HOSPITAL | Age: 64
Discharge: HOME | End: 2024-10-25
Attending: STUDENT IN AN ORGANIZED HEALTH CARE EDUCATION/TRAINING PROGRAM
Payer: MEDICAID

## 2024-10-25 VITALS
OXYGEN SATURATION: 100 % | DIASTOLIC BLOOD PRESSURE: 92 MMHG | TEMPERATURE: 98 F | BODY MASS INDEX: 21.2 KG/M2 | RESPIRATION RATE: 18 BRPM | WEIGHT: 160 LBS | SYSTOLIC BLOOD PRESSURE: 127 MMHG | HEART RATE: 80 BPM | HEIGHT: 73 IN

## 2024-10-25 DIAGNOSIS — N39.0 URINARY TRACT INFECTION WITHOUT HEMATURIA, SITE UNSPECIFIED: ICD-10-CM

## 2024-10-25 DIAGNOSIS — K40.90 NON-RECURRENT UNILATERAL INGUINAL HERNIA WITHOUT OBSTRUCTION OR GANGRENE: Primary | ICD-10-CM

## 2024-10-25 LAB
ALBUMIN SERPL-MCNC: 3.7 G/DL (ref 3.4–4.8)
ALBUMIN/GLOB SERPL: 1.2 RATIO (ref 1.1–2)
ALP SERPL-CCNC: 62 UNIT/L (ref 40–150)
ALT SERPL-CCNC: 32 UNIT/L (ref 0–55)
AMORPH URATE CRY URNS QL MICRO: ABNORMAL /UL
ANION GAP SERPL CALC-SCNC: 5 MEQ/L
AST SERPL-CCNC: 46 UNIT/L (ref 5–34)
BACTERIA #/AREA URNS AUTO: ABNORMAL /HPF
BASOPHILS # BLD AUTO: 0.04 X10(3)/MCL
BASOPHILS NFR BLD AUTO: 0.7 %
BILIRUB SERPL-MCNC: 0.7 MG/DL
BILIRUB UR QL STRIP.AUTO: NEGATIVE
BUN SERPL-MCNC: 11.3 MG/DL (ref 8.4–25.7)
CALCIUM SERPL-MCNC: 9.2 MG/DL (ref 8.8–10)
CHLORIDE SERPL-SCNC: 107 MMOL/L (ref 98–107)
CLARITY UR: ABNORMAL
CO2 SERPL-SCNC: 27 MMOL/L (ref 23–31)
COLOR UR AUTO: YELLOW
CREAT SERPL-MCNC: 0.87 MG/DL (ref 0.72–1.25)
CREAT/UREA NIT SERPL: 13
EOSINOPHIL # BLD AUTO: 0.4 X10(3)/MCL (ref 0–0.9)
EOSINOPHIL NFR BLD AUTO: 6.7 %
ERYTHROCYTE [DISTWIDTH] IN BLOOD BY AUTOMATED COUNT: 13.3 % (ref 11.5–17)
GFR SERPLBLD CREATININE-BSD FMLA CKD-EPI: >60 ML/MIN/1.73/M2
GLOBULIN SER-MCNC: 3 GM/DL (ref 2.4–3.5)
GLUCOSE SERPL-MCNC: 127 MG/DL (ref 82–115)
GLUCOSE UR QL STRIP: NORMAL
HCT VFR BLD AUTO: 40.6 % (ref 42–52)
HGB BLD-MCNC: 13.4 G/DL (ref 14–18)
HGB UR QL STRIP: NEGATIVE
IMM GRANULOCYTES # BLD AUTO: 0.01 X10(3)/MCL (ref 0–0.04)
IMM GRANULOCYTES NFR BLD AUTO: 0.2 %
KETONES UR QL STRIP: NEGATIVE
LEUKOCYTE ESTERASE UR QL STRIP: 25
LYMPHOCYTES # BLD AUTO: 1.45 X10(3)/MCL (ref 0.6–4.6)
LYMPHOCYTES NFR BLD AUTO: 24.3 %
MCH RBC QN AUTO: 30.2 PG (ref 27–31)
MCHC RBC AUTO-ENTMCNC: 33 G/DL (ref 33–36)
MCV RBC AUTO: 91.4 FL (ref 80–94)
MONOCYTES # BLD AUTO: 0.5 X10(3)/MCL (ref 0.1–1.3)
MONOCYTES NFR BLD AUTO: 8.4 %
MUCOUS THREADS URNS QL MICRO: ABNORMAL /LPF
NEUTROPHILS # BLD AUTO: 3.56 X10(3)/MCL (ref 2.1–9.2)
NEUTROPHILS NFR BLD AUTO: 59.7 %
NITRITE UR QL STRIP: NEGATIVE
NRBC BLD AUTO-RTO: 0 %
PH UR STRIP: 7.5 [PH]
PLATELET # BLD AUTO: 166 X10(3)/MCL (ref 130–400)
PMV BLD AUTO: 10.8 FL (ref 7.4–10.4)
POTASSIUM SERPL-SCNC: 3.9 MMOL/L (ref 3.5–5.1)
PROT SERPL-MCNC: 6.7 GM/DL (ref 5.8–7.6)
PROT UR QL STRIP: NEGATIVE
RBC # BLD AUTO: 4.44 X10(6)/MCL (ref 4.7–6.1)
RBC #/AREA URNS AUTO: ABNORMAL /HPF
SODIUM SERPL-SCNC: 139 MMOL/L (ref 136–145)
SP GR UR STRIP.AUTO: 1.02 (ref 1–1.03)
SQUAMOUS #/AREA URNS LPF: ABNORMAL /HPF
UROBILINOGEN UR STRIP-ACNC: NORMAL
WBC # BLD AUTO: 5.96 X10(3)/MCL (ref 4.5–11.5)
WBC #/AREA URNS AUTO: ABNORMAL /HPF

## 2024-10-25 PROCEDURE — 99283 EMERGENCY DEPT VISIT LOW MDM: CPT

## 2024-10-25 PROCEDURE — 80053 COMPREHEN METABOLIC PANEL: CPT | Performed by: NURSE PRACTITIONER

## 2024-10-25 PROCEDURE — 81001 URINALYSIS AUTO W/SCOPE: CPT | Performed by: NURSE PRACTITIONER

## 2024-10-25 PROCEDURE — 85025 COMPLETE CBC W/AUTO DIFF WBC: CPT | Performed by: NURSE PRACTITIONER

## 2024-10-25 RX ORDER — NITROFURANTOIN 25; 75 MG/1; MG/1
100 CAPSULE ORAL 2 TIMES DAILY
Qty: 10 CAPSULE | Refills: 0 | Status: SHIPPED | OUTPATIENT
Start: 2024-10-25 | End: 2024-10-30

## 2024-11-06 DIAGNOSIS — R10.30 PAIN IN THE GROIN: ICD-10-CM

## 2024-11-06 DIAGNOSIS — R10.30 LOWER ABDOMINAL PAIN: Primary | ICD-10-CM

## 2024-11-12 ENCOUNTER — HOSPITAL ENCOUNTER (OUTPATIENT)
Dept: RADIOLOGY | Facility: HOSPITAL | Age: 64
Discharge: HOME | End: 2024-11-12
Payer: MEDICAID

## 2024-11-12 DIAGNOSIS — R10.30 LOWER ABDOMINAL PAIN: ICD-10-CM

## 2024-11-12 PROCEDURE — 76700 US EXAM ABDOM COMPLETE: CPT | Mod: TC

## 2025-02-06 ENCOUNTER — CLINICAL SUPPORT (OUTPATIENT)
Dept: SMOKING CESSATION | Facility: CLINIC | Age: 65
End: 2025-02-06

## 2025-02-06 DIAGNOSIS — F17.200 NICOTINE DEPENDENCE: Primary | ICD-10-CM

## 2025-02-06 PROCEDURE — 99999 PR PBB SHADOW E&M-EST. PATIENT-LVL I: CPT | Mod: PBBFAC,,,

## 2025-02-06 PROCEDURE — 99406 BEHAV CHNG SMOKING 3-10 MIN: CPT | Mod: ,,,

## 2025-02-06 NOTE — PROGRESS NOTES
Spoke with patient today in regard to smoking cessation progress for 6 month telephone follow up, he states not tobacco free.  Patient states not interested in returning to the program at this time.  Informed patient of benefit period, future follow up, and contact information if any further help or support is needed.  Will complete smart form for 3/6 month follow up on Quit attempt #1.

## 2025-02-13 DIAGNOSIS — Q61.02 MULTIPLE RENAL CYSTS: Primary | ICD-10-CM

## 2025-03-03 DIAGNOSIS — Q61.02 MULTIPLE RENAL CYSTS: Primary | ICD-10-CM

## 2025-03-11 ENCOUNTER — LAB VISIT (OUTPATIENT)
Dept: LAB | Facility: HOSPITAL | Age: 65
End: 2025-03-11
Attending: NURSE PRACTITIONER
Payer: MEDICAID

## 2025-03-11 ENCOUNTER — OFFICE VISIT (OUTPATIENT)
Dept: NEPHROLOGY | Facility: CLINIC | Age: 65
End: 2025-03-11
Payer: MEDICAID

## 2025-03-11 VITALS
DIASTOLIC BLOOD PRESSURE: 74 MMHG | HEART RATE: 75 BPM | WEIGHT: 154 LBS | SYSTOLIC BLOOD PRESSURE: 118 MMHG | RESPIRATION RATE: 18 BRPM | OXYGEN SATURATION: 95 % | BODY MASS INDEX: 20.41 KG/M2 | HEIGHT: 73 IN

## 2025-03-11 DIAGNOSIS — I10 PRIMARY HYPERTENSION: ICD-10-CM

## 2025-03-11 DIAGNOSIS — E78.00 HYPERCHOLESTEROLEMIA: ICD-10-CM

## 2025-03-11 DIAGNOSIS — N28.1 RENAL CYST, RIGHT: Primary | ICD-10-CM

## 2025-03-11 DIAGNOSIS — R73.03 PREDIABETES: ICD-10-CM

## 2025-03-11 DIAGNOSIS — Q61.02 MULTIPLE RENAL CYSTS: ICD-10-CM

## 2025-03-11 DIAGNOSIS — F17.200 CURRENT SMOKER: ICD-10-CM

## 2025-03-11 LAB
ALBUMIN SERPL-MCNC: 3.9 G/DL (ref 3.4–4.8)
ALBUMIN/GLOB SERPL: 1.1 RATIO (ref 1.1–2)
ALP SERPL-CCNC: 71 UNIT/L (ref 40–150)
ALT SERPL-CCNC: 17 UNIT/L (ref 0–55)
ANION GAP SERPL CALC-SCNC: 11 MEQ/L
AST SERPL-CCNC: 20 UNIT/L (ref 5–34)
BACTERIA #/AREA URNS AUTO: ABNORMAL /HPF
BASOPHILS # BLD AUTO: 0.04 X10(3)/MCL
BASOPHILS NFR BLD AUTO: 0.5 %
BILIRUB SERPL-MCNC: 0.4 MG/DL
BILIRUB UR QL STRIP.AUTO: NEGATIVE
BUN SERPL-MCNC: 8.5 MG/DL (ref 8.4–25.7)
CALCIUM SERPL-MCNC: 9.7 MG/DL (ref 8.8–10)
CHLORIDE SERPL-SCNC: 105 MMOL/L (ref 98–107)
CHOLEST SERPL-MCNC: 151 MG/DL
CHOLEST/HDLC SERPL: 4 {RATIO} (ref 0–5)
CLARITY UR: CLEAR
CO2 SERPL-SCNC: 28 MMOL/L (ref 23–31)
COLOR UR AUTO: ABNORMAL
CREAT SERPL-MCNC: 1.02 MG/DL (ref 0.72–1.25)
CREAT UR-MCNC: 130.1 MG/DL (ref 63–166)
CREAT/UREA NIT SERPL: 8
EOSINOPHIL # BLD AUTO: 0.21 X10(3)/MCL (ref 0–0.9)
EOSINOPHIL NFR BLD AUTO: 2.5 %
ERYTHROCYTE [DISTWIDTH] IN BLOOD BY AUTOMATED COUNT: 13.2 % (ref 11.5–17)
EST. AVERAGE GLUCOSE BLD GHB EST-MCNC: 116.9 MG/DL
GFR SERPLBLD CREATININE-BSD FMLA CKD-EPI: >60 ML/MIN/1.73/M2
GLOBULIN SER-MCNC: 3.7 GM/DL (ref 2.4–3.5)
GLUCOSE SERPL-MCNC: 117 MG/DL (ref 82–115)
GLUCOSE UR QL STRIP: NORMAL
HBA1C MFR BLD: 5.7 %
HCT VFR BLD AUTO: 43.5 % (ref 42–52)
HDLC SERPL-MCNC: 43 MG/DL (ref 35–60)
HGB BLD-MCNC: 13.9 G/DL (ref 14–18)
HGB UR QL STRIP: NEGATIVE
HYALINE CASTS #/AREA URNS LPF: ABNORMAL /LPF
IMM GRANULOCYTES # BLD AUTO: 0.02 X10(3)/MCL (ref 0–0.04)
IMM GRANULOCYTES NFR BLD AUTO: 0.2 %
KETONES UR QL STRIP: NEGATIVE
LDLC SERPL CALC-MCNC: 95 MG/DL (ref 50–140)
LEUKOCYTE ESTERASE UR QL STRIP: NEGATIVE
LYMPHOCYTES # BLD AUTO: 2.12 X10(3)/MCL (ref 0.6–4.6)
LYMPHOCYTES NFR BLD AUTO: 25.3 %
MCH RBC QN AUTO: 30.4 PG (ref 27–31)
MCHC RBC AUTO-ENTMCNC: 32 G/DL (ref 33–36)
MCV RBC AUTO: 95.2 FL (ref 80–94)
MONOCYTES # BLD AUTO: 0.66 X10(3)/MCL (ref 0.1–1.3)
MONOCYTES NFR BLD AUTO: 7.9 %
MUCOUS THREADS URNS QL MICRO: ABNORMAL /LPF
NEUTROPHILS # BLD AUTO: 5.32 X10(3)/MCL (ref 2.1–9.2)
NEUTROPHILS NFR BLD AUTO: 63.6 %
NITRITE UR QL STRIP: NEGATIVE
NRBC BLD AUTO-RTO: 0 %
PH UR STRIP: 6.5 [PH]
PHOSPHATE SERPL-MCNC: 4 MG/DL (ref 2.3–4.7)
PLATELET # BLD AUTO: 210 X10(3)/MCL (ref 130–400)
PMV BLD AUTO: 9.4 FL (ref 7.4–10.4)
POTASSIUM SERPL-SCNC: 3.9 MMOL/L (ref 3.5–5.1)
PROT SERPL-MCNC: 7.6 GM/DL (ref 5.8–7.6)
PROT UR QL STRIP: NEGATIVE
PROT UR STRIP-MCNC: 12.6 MG/DL
RBC # BLD AUTO: 4.57 X10(6)/MCL (ref 4.7–6.1)
RBC #/AREA URNS AUTO: ABNORMAL /HPF
SODIUM SERPL-SCNC: 144 MMOL/L (ref 136–145)
SP GR UR STRIP.AUTO: 1.01 (ref 1–1.03)
SQUAMOUS #/AREA URNS LPF: ABNORMAL /HPF
TRIGL SERPL-MCNC: 66 MG/DL (ref 34–140)
URINE PROTEIN/CREATININE RATIO (OLG): 0.1
UROBILINOGEN UR STRIP-ACNC: NORMAL
VLDLC SERPL CALC-MCNC: 13 MG/DL
WBC # BLD AUTO: 8.37 X10(3)/MCL (ref 4.5–11.5)
WBC #/AREA URNS AUTO: ABNORMAL /HPF

## 2025-03-11 PROCEDURE — 83036 HEMOGLOBIN GLYCOSYLATED A1C: CPT

## 2025-03-11 PROCEDURE — 1159F MED LIST DOCD IN RCRD: CPT | Mod: CPTII,,, | Performed by: NURSE PRACTITIONER

## 2025-03-11 PROCEDURE — 3078F DIAST BP <80 MM HG: CPT | Mod: CPTII,,, | Performed by: NURSE PRACTITIONER

## 2025-03-11 PROCEDURE — 3066F NEPHROPATHY DOC TX: CPT | Mod: CPTII,,, | Performed by: NURSE PRACTITIONER

## 2025-03-11 PROCEDURE — 99999 PR PBB SHADOW E&M-EST. PATIENT-LVL IV: CPT | Mod: PBBFAC,,, | Performed by: NURSE PRACTITIONER

## 2025-03-11 PROCEDURE — 80061 LIPID PANEL: CPT

## 2025-03-11 PROCEDURE — 3044F HG A1C LEVEL LT 7.0%: CPT | Mod: CPTII,,, | Performed by: NURSE PRACTITIONER

## 2025-03-11 PROCEDURE — 81001 URINALYSIS AUTO W/SCOPE: CPT

## 2025-03-11 PROCEDURE — 84100 ASSAY OF PHOSPHORUS: CPT

## 2025-03-11 PROCEDURE — 84156 ASSAY OF PROTEIN URINE: CPT

## 2025-03-11 PROCEDURE — 3008F BODY MASS INDEX DOCD: CPT | Mod: CPTII,,, | Performed by: NURSE PRACTITIONER

## 2025-03-11 PROCEDURE — 80053 COMPREHEN METABOLIC PANEL: CPT

## 2025-03-11 PROCEDURE — 36415 COLL VENOUS BLD VENIPUNCTURE: CPT

## 2025-03-11 PROCEDURE — 3074F SYST BP LT 130 MM HG: CPT | Mod: CPTII,,, | Performed by: NURSE PRACTITIONER

## 2025-03-11 PROCEDURE — 85025 COMPLETE CBC W/AUTO DIFF WBC: CPT

## 2025-03-11 PROCEDURE — 99204 OFFICE O/P NEW MOD 45 MIN: CPT | Mod: S$PBB,,, | Performed by: NURSE PRACTITIONER

## 2025-03-11 PROCEDURE — 99214 OFFICE O/P EST MOD 30 MIN: CPT | Mod: PBBFAC | Performed by: NURSE PRACTITIONER

## 2025-03-11 NOTE — PROGRESS NOTES
[facility] [place] [facility] New Referral Office Note    HPI  Adair Real, 64 y.o. male, presents to office as a new patient evaluation of right renal cyst.  He has a history of HTN, HLD, COPD.  Has a cysts identified on the right kidney that has been there since at least 2022 on prior examination.  Renal function is stable with creatinine 1.0.  No significant proteinuria.  He has no complaints today.  Denies any  symptoms.  No history of nephrolithiasis.  Overall doing well.      Patient denies taking NSAIDs or new antibiotics. Also denies recent episode of dehydration, diarrhea, vomiting, acute illness, hospitalization, recent angiograms or exposure to IV radiocontrast.        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      Problem List[1]    Surgical History:   Past Surgical History:   Procedure Laterality Date    FRACTURE SURGERY         Family History:   Family History   Problem Relation Name Age of Onset    No Known Problems Mother      No Known Problems Father      No Known Problems Sister      No Known Problems Brother         Social History:   Social History     Tobacco Use    Smoking status: Every Day     Current packs/day: 1.00     Average packs/day: 0.8 packs/day for 49.2 years (37.2 ttl pk-yrs)     Types: Cigarettes     Start date: 1976     Passive exposure: Past    Smokeless tobacco: Never   Substance Use Topics    Alcohol use: Not Currently     Comment: stopped x 2 years       Allergies:  Review of patient's allergies indicates:  No Known Allergies    Medications:  Outpatient Medications Marked as Taking for the 3/11/25 encounter (Office Visit) with Joya Gan ACNP   Medication Sig Dispense Refill    albuterol (PROVENTIL/VENTOLIN HFA) 90 mcg/actuation inhaler Inhale 1-2 puffs into the lungs every 4 (four) hours as needed for Wheezing or Shortness  "of Breath. 18 g 2    amLODIPine (NORVASC) 10 MG tablet Take 1 tablet (10 mg total) by mouth once daily. 90 tablet 2    atorvastatin (LIPITOR) 20 MG tablet Take 1 tablet (20 mg total) by mouth every evening. 90 tablet 3    blood pressure monitor Kit 1 each by Misc.(Non-Drug; Combo Route) route 2 (two) times a day. 1 each 0    medical supply, miscellaneous (BLOOD PRESSURE CUFF MISC) use as directed      omeprazole (PRILOSEC) 20 MG capsule Take 1 capsule (20 mg total) by mouth once daily. 90 capsule 2    rOPINIRole (REQUIP) 2 MG tablet Take 1 tablet (2 mg total) by mouth every evening. 90 tablet 2    sildenafiL (VIAGRA) 25 MG tablet Take 1 tablet (25 mg total) by mouth daily as needed for Erectile Dysfunction. 30 tablet 2    tiZANidine (ZANAFLEX) 4 MG tablet Take 2 tablets (8 mg total) by mouth every 8 (eight) hours as needed (muscle spasms). 90 tablet 2    umeclidinium-vilanteroL (ANORO ELLIPTA) 62.5-25 mcg/actuation DsDv Inhale 1 puff into the lungs once daily. 60 each 2         Review of Systems:    Constitutional: Denies fever, fatigue, generalized weakness  Skin: Denies wounds, no rashes, no itching, no new skin lesions  Respiratory:  Denies cough, shortness of breath, or wheezing  Cardiovascular: Denies chest pain, palpitations, or swelling  Gastrointestional: Denies abdominal pain, nausea, vomiting, diarrhea, or constipation  Genitourinary: Denies dysuria, hematuria, foamy urine, or incontinence; reports able to empty bladder  Musculoskeletal: Denies back or flank pain  Neurological: Denies headaches, dizziness, paresthesias, tremors or focal weakness      Vital Signs:  /74 (BP Location: Left arm, Patient Position: Sitting)   Pulse 75   Resp 18   Ht 6' 1" (1.854 m)   Wt 69.9 kg (154 lb)   SpO2 95%   BMI 20.32 kg/m²   Body mass index is 20.32 kg/m².      Physical Exam:    General: no acute distress, awake, alert  Eyes: PERRLA, conjunctiva clear, eyelids without swelling  HENT: atraumatic, oropharynx " and nasal mucosa patent  Neck: supple, trache midline, full ROM, no JVD, no thyromegaly or lymphadenopathy  Respiratory: equal, unlabored, clear to auscultation A/P  Cardiovascular: RRR without rub  Edema: none  Gastrointestinal: soft, non-tender, non-distended; positive bowel sounds; no masses to palpation; no ascites  Genitourinary: no CVA tenderness upon palpation  Musculoskeletal: ROM without new limitation or discomfort  Integumentary: warm, dry; no rashes, wounds, or skin lesions  Neurological: oriented x4, appropriate, no acute deficits; no asterixis      Labs:        Component Value Date/Time     03/11/2025 0900     10/25/2024 1127    K 3.9 03/11/2025 0900    K 3.9 10/25/2024 1127     03/11/2025 0900     10/25/2024 1127    CO2 28 03/11/2025 0900    CO2 27 10/25/2024 1127    BUN 8.5 03/11/2025 0900    BUN 11.3 10/25/2024 1127    CREATININE 1.02 03/11/2025 0900    CREATININE 0.87 10/25/2024 1127    CREATININE 1.14 08/07/2024 0759    CREATININE 1.12 01/02/2024 0853    CALCIUM 9.7 03/11/2025 0900    CALCIUM 9.2 10/25/2024 1127    PHOS 4.0 03/11/2025 0900           Component Value Date/Time    WBC 8.37 03/11/2025 0900    WBC 5.96 10/25/2024 1127    HGB 13.9 (L) 03/11/2025 0900    HGB 13.4 (L) 10/25/2024 1127    HCT 43.5 03/11/2025 0900    HCT 40.6 (L) 10/25/2024 1127     03/11/2025 0900     10/25/2024 1127         Imaging:  Abdominal ultrasound 11/12/24  Regarding the kidneys:  Both kidneys are of normal size shape and contour with no abnormal masses or hydronephrosis.     Cyst identified on the right measuring 1.6 x 1.8 x 1.8 cm       Impression:    1. Renal cyst, right    2. Primary hypertension      Stable appearing right renal cyst.    BP at goal.      Plan:  Discussed with patient no necessary follow up with stable Singulair right renal cyst.  If this were to become complex for some concern for right renal mass and patient would need referral to Urology for  evaluation  Stressed importance of hypertensive control.  Encouraged smoking cessation.    Can continue to follow up with PCP.       Patient to call our office with any concerns prior to next appointment.    Avoid NSAIDs (Aleve, Mobic, Celebrex, Ibuprofen, Advil, Toradol and Diclofenac).  Only take tylenol occasionally if needed for aches/pains.    Educated on low sodium diet:  Avoid high salt foods (olives, pickles, smoked meats, deli meats, salted potato chips, etc.).   Do not add salt to your food at the table.   Use only small amounts of salt when cooking.    Joya SaucedoBC        This note was created with the assistance of [employer] voice recognition software or phone dictation. There may be transcription errors as a result of using this technology however minimal. Effort has been made to assure accuracy of transcription but any obvious errors or omissions should be clarified with the author of the document.         [1]   Patient Active Problem List  Diagnosis    Hypercholesterolemia    Primary hypertension    Cigarette nicotine dependence, uncomplicated    Peripheral polyneuropathy    Gastroesophageal reflux disease without esophagitis    Restless legs    Screening for malignant neoplasm of colon    Pain in both lower legs    BMI 21.0-21.9, adult    Well adult exam    Vitamin D insufficiency    Chronic obstructive pulmonary disease    Immunization due    Other male erectile dysfunction    Positive colorectal cancer screening using Cologuard test    Prediabetes    Body mass index (BMI) 19.9 or less, adult

## 2025-07-18 ENCOUNTER — PATIENT OUTREACH (OUTPATIENT)
Facility: CLINIC | Age: 65
End: 2025-07-18
Payer: MEDICAID

## 2025-07-18 NOTE — PROGRESS NOTES
Health Maintenance Topic(s) Outreach Outcomes & Actions Taken:    Low Dose CT Screening - Outreach Outcomes & Actions Taken  : message sent to pcp to order LDCT       Additional Notes:  LDCT report

## 2025-08-07 ENCOUNTER — TELEPHONE (OUTPATIENT)
Dept: SMOKING CESSATION | Facility: CLINIC | Age: 65
End: 2025-08-07
Payer: MEDICAID

## 2025-08-07 NOTE — TELEPHONE ENCOUNTER
Called patient about 12 month follow up. No answer and/or voicemail available to leave message.   Resolved quit episode.

## 2025-08-27 ENCOUNTER — TELEPHONE (OUTPATIENT)
Dept: SMOKING CESSATION | Facility: CLINIC | Age: 65
End: 2025-08-27
Payer: MEDICAID